# Patient Record
Sex: FEMALE | Race: WHITE | NOT HISPANIC OR LATINO | Employment: OTHER | URBAN - METROPOLITAN AREA
[De-identification: names, ages, dates, MRNs, and addresses within clinical notes are randomized per-mention and may not be internally consistent; named-entity substitution may affect disease eponyms.]

---

## 2022-02-12 ENCOUNTER — HOSPITAL ENCOUNTER (INPATIENT)
Facility: CLINIC | Age: 82
LOS: 2 days | Discharge: HOME | DRG: 557 | End: 2022-02-15
Attending: EMERGENCY MEDICINE | Admitting: HOSPITALIST
Payer: MEDICARE

## 2022-02-12 ENCOUNTER — APPOINTMENT (OUTPATIENT)
Dept: CT IMAGING | Facility: CLINIC | Age: 82
DRG: 557 | End: 2022-02-12
Attending: EMERGENCY MEDICINE
Payer: MEDICARE

## 2022-02-12 DIAGNOSIS — K44.9 HIATAL HERNIA: ICD-10-CM

## 2022-02-12 DIAGNOSIS — N39.0 ACUTE UTI: Primary | ICD-10-CM
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
## 2022-02-12 DIAGNOSIS — E83.42 HYPOMAGNESEMIA: ICD-10-CM

## 2022-02-12 DIAGNOSIS — R41.0 CONFUSION: ICD-10-CM

## 2022-02-12 DIAGNOSIS — R21 RASH: ICD-10-CM

## 2022-02-12 DIAGNOSIS — R53.81 PHYSICAL DECONDITIONING: ICD-10-CM

## 2022-02-12 DIAGNOSIS — I71.21 THORACIC ASCENDING AORTIC ANEURYSM (H): ICD-10-CM

## 2022-02-12 DIAGNOSIS — T79.6XXA TRAUMATIC RHABDOMYOLYSIS, INITIAL ENCOUNTER (H): ICD-10-CM

## 2022-02-12 DIAGNOSIS — N83.201 RIGHT OVARIAN CYST: ICD-10-CM

## 2022-02-12 DIAGNOSIS — N28.89 RENAL MASS: ICD-10-CM

## 2022-02-12 DIAGNOSIS — E78.5 HYPERLIPIDEMIA, UNSPECIFIED HYPERLIPIDEMIA TYPE: ICD-10-CM
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
## 2022-02-12 PROBLEM — N17.9 AKI (ACUTE KIDNEY INJURY) (H): Status: ACTIVE | Noted: 2022-02-12

## 2022-02-12 LAB
ALBUMIN UR-MCNC: 50 MG/DL
ANION GAP SERPL CALCULATED.3IONS-SCNC: 10 MMOL/L (ref 5–18)
APPEARANCE UR: ABNORMAL
APTT PPP: 78 SECONDS (ref 22–38)
BACTERIA #/AREA URNS HPF: ABNORMAL /HPF
BASOPHILS # BLD AUTO: 0 10E3/UL (ref 0–0.2)
BASOPHILS NFR BLD AUTO: 0 %
BILIRUB UR QL STRIP: NEGATIVE
BUN SERPL-MCNC: 28 MG/DL (ref 8–28)
CALCIUM SERPL-MCNC: 9.3 MG/DL (ref 8.5–10.5)
CELLULAR CAST: 2 /LPF
CHLORIDE BLD-SCNC: 111 MMOL/L (ref 98–107)
CK SERPL-CCNC: 3789 U/L (ref 30–190)
CO2 SERPL-SCNC: 19 MMOL/L (ref 22–31)
COLOR UR AUTO: YELLOW
CREAT BLD-MCNC: 1.2 MG/DL (ref 0.6–1.1)
CREAT SERPL-MCNC: 1.22 MG/DL (ref 0.6–1.1)
EOSINOPHIL # BLD AUTO: 0 10E3/UL (ref 0–0.7)
EOSINOPHIL NFR BLD AUTO: 0 %
ERYTHROCYTE [DISTWIDTH] IN BLOOD BY AUTOMATED COUNT: 14.1 % (ref 10–15)
ETHANOL SERPL-MCNC: <10 MG/DL
GFR SERPL CREATININE-BSD FRML MDRD: 44 ML/MIN/1.73M2
GFR SERPL CREATININE-BSD FRML MDRD: 44 ML/MIN/1.73M2
GLUCOSE BLD-MCNC: 125 MG/DL (ref 70–125)
GLUCOSE UR STRIP-MCNC: NEGATIVE MG/DL
GRANULAR CAST: 14 /LPF
HCT VFR BLD AUTO: 43.2 % (ref 35–47)
HGB BLD-MCNC: 14.4 G/DL (ref 11.7–15.7)
HGB UR QL STRIP: ABNORMAL
HOLD SPECIMEN: NORMAL
HYALINE CASTS: 76 /LPF
IMM GRANULOCYTES # BLD: 0 10E3/UL
IMM GRANULOCYTES NFR BLD: 1 %
INR PPP: 4.1 (ref 0.85–1.15)
KETONES UR STRIP-MCNC: 10 MG/DL
LACTATE SERPL-SCNC: 1.5 MMOL/L (ref 0.7–2)
LACTATE SERPL-SCNC: 2.2 MMOL/L (ref 0.7–2)
LEUKOCYTE ESTERASE UR QL STRIP: ABNORMAL
LYMPHOCYTES # BLD AUTO: 0.4 10E3/UL (ref 0.8–5.3)
LYMPHOCYTES NFR BLD AUTO: 5 %
MAGNESIUM SERPL-MCNC: 1.5 MG/DL (ref 1.8–2.6)
MAGNESIUM SERPL-MCNC: 2.1 MG/DL (ref 1.8–2.6)
MCH RBC QN AUTO: 34.8 PG (ref 26.5–33)
MCHC RBC AUTO-ENTMCNC: 33.3 G/DL (ref 31.5–36.5)
MCV RBC AUTO: 104 FL (ref 78–100)
MONOCYTES # BLD AUTO: 1.3 10E3/UL (ref 0–1.3)
MONOCYTES NFR BLD AUTO: 15 %
MUCOUS THREADS #/AREA URNS LPF: PRESENT /LPF
NEUTROPHILS # BLD AUTO: 6.8 10E3/UL (ref 1.6–8.3)
NEUTROPHILS NFR BLD AUTO: 79 %
NITRATE UR QL: NEGATIVE
NRBC # BLD AUTO: 0 10E3/UL
NRBC BLD AUTO-RTO: 0 /100
PH UR STRIP: 5 [PH] (ref 5–7)
PHOSPHATE SERPL-MCNC: 2.8 MG/DL (ref 2.5–4.5)
PLATELET # BLD AUTO: 221 10E3/UL (ref 150–450)
POTASSIUM BLD-SCNC: 4.2 MMOL/L (ref 3.5–5)
PROCALCITONIN SERPL-MCNC: 0.1 NG/ML (ref 0–0.49)
RBC # BLD AUTO: 4.14 10E6/UL (ref 3.8–5.2)
RBC URINE: 6 /HPF
SARS-COV-2 RNA RESP QL NAA+PROBE: NEGATIVE
SODIUM SERPL-SCNC: 140 MMOL/L (ref 136–145)
SP GR UR STRIP: 1.02 (ref 1–1.03)
SQUAMOUS EPITHELIAL: 2 /HPF
TRANSITIONAL EPI: <1 /HPF
TROPONIN I SERPL-MCNC: 0.08 NG/ML (ref 0–0.29)
TSH SERPL DL<=0.005 MIU/L-ACNC: 1.23 UIU/ML (ref 0.3–5)
UROBILINOGEN UR STRIP-MCNC: <2 MG/DL
WBC # BLD AUTO: 8.6 10E3/UL (ref 4–11)
WBC URINE: 32 /HPF

## 2022-02-12 PROCEDURE — 83735 ASSAY OF MAGNESIUM: CPT | Performed by: EMERGENCY MEDICINE

## 2022-02-12 PROCEDURE — 96361 HYDRATE IV INFUSION ADD-ON: CPT

## 2022-02-12 PROCEDURE — 36415 COLL VENOUS BLD VENIPUNCTURE: CPT | Performed by: EMERGENCY MEDICINE

## 2022-02-12 PROCEDURE — 83605 ASSAY OF LACTIC ACID: CPT | Performed by: HOSPITALIST

## 2022-02-12 PROCEDURE — 36415 COLL VENOUS BLD VENIPUNCTURE: CPT | Performed by: HOSPITALIST

## 2022-02-12 PROCEDURE — 258N000003 HC RX IP 258 OP 636: Performed by: EMERGENCY MEDICINE

## 2022-02-12 PROCEDURE — 82550 ASSAY OF CK (CPK): CPT | Performed by: EMERGENCY MEDICINE

## 2022-02-12 PROCEDURE — 72125 CT NECK SPINE W/O DYE: CPT

## 2022-02-12 PROCEDURE — G0378 HOSPITAL OBSERVATION PER HR: HCPCS

## 2022-02-12 PROCEDURE — 85025 COMPLETE CBC W/AUTO DIFF WBC: CPT | Performed by: EMERGENCY MEDICINE

## 2022-02-12 PROCEDURE — 258N000003 HC RX IP 258 OP 636: Performed by: HOSPITALIST

## 2022-02-12 PROCEDURE — 87635 SARS-COV-2 COVID-19 AMP PRB: CPT | Performed by: EMERGENCY MEDICINE

## 2022-02-12 PROCEDURE — 80048 BASIC METABOLIC PNL TOTAL CA: CPT | Performed by: EMERGENCY MEDICINE

## 2022-02-12 PROCEDURE — 82077 ASSAY SPEC XCP UR&BREATH IA: CPT | Performed by: EMERGENCY MEDICINE

## 2022-02-12 PROCEDURE — 81001 URINALYSIS AUTO W/SCOPE: CPT | Performed by: EMERGENCY MEDICINE

## 2022-02-12 PROCEDURE — 99285 EMERGENCY DEPT VISIT HI MDM: CPT | Mod: 25

## 2022-02-12 PROCEDURE — 250N000011 HC RX IP 250 OP 636: Performed by: EMERGENCY MEDICINE

## 2022-02-12 PROCEDURE — 93005 ELECTROCARDIOGRAM TRACING: CPT | Performed by: EMERGENCY MEDICINE

## 2022-02-12 PROCEDURE — 85610 PROTHROMBIN TIME: CPT | Performed by: EMERGENCY MEDICINE

## 2022-02-12 PROCEDURE — 250N000013 HC RX MED GY IP 250 OP 250 PS 637: Performed by: HOSPITALIST

## 2022-02-12 PROCEDURE — 87086 URINE CULTURE/COLONY COUNT: CPT | Performed by: EMERGENCY MEDICINE

## 2022-02-12 PROCEDURE — 71250 CT THORAX DX C-: CPT

## 2022-02-12 PROCEDURE — 87040 BLOOD CULTURE FOR BACTERIA: CPT | Performed by: EMERGENCY MEDICINE

## 2022-02-12 PROCEDURE — C9803 HOPD COVID-19 SPEC COLLECT: HCPCS

## 2022-02-12 PROCEDURE — 96365 THER/PROPH/DIAG IV INF INIT: CPT

## 2022-02-12 PROCEDURE — 84145 PROCALCITONIN (PCT): CPT | Performed by: EMERGENCY MEDICINE

## 2022-02-12 PROCEDURE — 82565 ASSAY OF CREATININE: CPT

## 2022-02-12 PROCEDURE — 83735 ASSAY OF MAGNESIUM: CPT | Performed by: HOSPITALIST

## 2022-02-12 PROCEDURE — 84100 ASSAY OF PHOSPHORUS: CPT | Performed by: HOSPITALIST

## 2022-02-12 PROCEDURE — 70450 CT HEAD/BRAIN W/O DYE: CPT

## 2022-02-12 PROCEDURE — 84484 ASSAY OF TROPONIN QUANT: CPT | Performed by: EMERGENCY MEDICINE

## 2022-02-12 PROCEDURE — 99223 1ST HOSP IP/OBS HIGH 75: CPT | Mod: AI | Performed by: HOSPITALIST

## 2022-02-12 PROCEDURE — 83605 ASSAY OF LACTIC ACID: CPT | Performed by: EMERGENCY MEDICINE

## 2022-02-12 PROCEDURE — 85730 THROMBOPLASTIN TIME PARTIAL: CPT | Performed by: EMERGENCY MEDICINE

## 2022-02-12 PROCEDURE — 84443 ASSAY THYROID STIM HORMONE: CPT | Performed by: HOSPITALIST

## 2022-02-12 RX ORDER — LISINOPRIL 5 MG/1
5 TABLET ORAL EVERY MORNING
Status: DISCONTINUED | OUTPATIENT
Start: 2022-02-13 | End: 2022-02-15 | Stop reason: HOSPADM

## 2022-02-12 RX ORDER — WARFARIN SODIUM 2.5 MG/1
2.5 TABLET ORAL DAILY
COMMUNITY
Start: 2021-12-21

## 2022-02-12 RX ORDER — MINERAL OIL, PETROLATUM 425; 573 MG/G; MG/G
1 OINTMENT OPHTHALMIC AT BEDTIME
Status: DISCONTINUED | OUTPATIENT
Start: 2022-02-12 | End: 2022-02-12

## 2022-02-12 RX ORDER — ONDANSETRON 2 MG/ML
4 INJECTION INTRAMUSCULAR; INTRAVENOUS EVERY 6 HOURS PRN
Status: DISCONTINUED | OUTPATIENT
Start: 2022-02-12 | End: 2022-02-15 | Stop reason: HOSPADM

## 2022-02-12 RX ORDER — ACETAMINOPHEN 650 MG/1
650 SUPPOSITORY RECTAL EVERY 6 HOURS PRN
Status: DISCONTINUED | OUTPATIENT
Start: 2022-02-12 | End: 2022-02-15 | Stop reason: HOSPADM

## 2022-02-12 RX ORDER — ANASTROZOLE 1 MG/1
1 TABLET ORAL DAILY
COMMUNITY
Start: 2022-01-04 | End: 2024-01-11

## 2022-02-12 RX ORDER — SODIUM CHLORIDE 9 MG/ML
INJECTION, SOLUTION INTRAVENOUS CONTINUOUS
Status: ACTIVE | OUTPATIENT
Start: 2022-02-12 | End: 2022-02-14

## 2022-02-12 RX ORDER — ATENOLOL 25 MG/1
25 TABLET ORAL DAILY
Status: DISCONTINUED | OUTPATIENT
Start: 2022-02-12 | End: 2022-02-15 | Stop reason: HOSPADM

## 2022-02-12 RX ORDER — ACETAMINOPHEN 325 MG/1
650 TABLET ORAL EVERY 6 HOURS PRN
Status: DISCONTINUED | OUTPATIENT
Start: 2022-02-12 | End: 2022-02-15 | Stop reason: HOSPADM

## 2022-02-12 RX ORDER — ACETAMINOPHEN 500 MG
500-1000 TABLET ORAL EVERY 6 HOURS PRN
COMMUNITY

## 2022-02-12 RX ORDER — MAGNESIUM SULFATE HEPTAHYDRATE 40 MG/ML
2 INJECTION, SOLUTION INTRAVENOUS ONCE
Status: COMPLETED | OUTPATIENT
Start: 2022-02-12 | End: 2022-02-12

## 2022-02-12 RX ORDER — VITAMIN B COMPLEX
25 TABLET ORAL DAILY
Status: DISCONTINUED | OUTPATIENT
Start: 2022-02-12 | End: 2022-02-15 | Stop reason: HOSPADM

## 2022-02-12 RX ORDER — SODIUM CHLORIDE 9 MG/ML
INJECTION, SOLUTION INTRAVENOUS ONCE
Status: COMPLETED | OUTPATIENT
Start: 2022-02-12 | End: 2022-02-12

## 2022-02-12 RX ORDER — PRAVASTATIN SODIUM 20 MG
40 TABLET ORAL AT BEDTIME
Status: DISCONTINUED | OUTPATIENT
Start: 2022-02-12 | End: 2022-02-15 | Stop reason: HOSPADM

## 2022-02-12 RX ORDER — LIDOCAINE 40 MG/G
CREAM TOPICAL
Status: DISCONTINUED | OUTPATIENT
Start: 2022-02-12 | End: 2022-02-15 | Stop reason: HOSPADM

## 2022-02-12 RX ORDER — ANASTROZOLE 1 MG/1
1 TABLET ORAL DAILY
Status: DISCONTINUED | OUTPATIENT
Start: 2022-02-12 | End: 2022-02-15 | Stop reason: HOSPADM

## 2022-02-12 RX ORDER — MINERAL OIL, PETROLATUM 425; 573 MG/G; MG/G
1 OINTMENT OPHTHALMIC AT BEDTIME
COMMUNITY
End: 2024-01-11

## 2022-02-12 RX ORDER — ONDANSETRON 4 MG/1
4 TABLET, ORALLY DISINTEGRATING ORAL EVERY 6 HOURS PRN
Status: DISCONTINUED | OUTPATIENT
Start: 2022-02-12 | End: 2022-02-15 | Stop reason: HOSPADM

## 2022-02-12 RX ORDER — VITAMIN B COMPLEX
25 TABLET ORAL DAILY
Status: DISCONTINUED | OUTPATIENT
Start: 2022-02-12 | End: 2022-02-12

## 2022-02-12 RX ORDER — SULFAMETHOXAZOLE/TRIMETHOPRIM 800-160 MG
1 TABLET ORAL 2 TIMES DAILY
Status: DISCONTINUED | OUTPATIENT
Start: 2022-02-12 | End: 2022-02-15 | Stop reason: HOSPADM

## 2022-02-12 RX ADMIN — PRAVASTATIN SODIUM 40 MG: 20 TABLET ORAL at 21:09

## 2022-02-12 RX ADMIN — SODIUM CHLORIDE 250 ML: 9 INJECTION, SOLUTION INTRAVENOUS at 18:47

## 2022-02-12 RX ADMIN — SODIUM CHLORIDE: 9 INJECTION, SOLUTION INTRAVENOUS at 16:26

## 2022-02-12 RX ADMIN — ATENOLOL 25 MG: 25 TABLET ORAL at 19:31

## 2022-02-12 RX ADMIN — MAGNESIUM SULFATE HEPTAHYDRATE 2 G: 40 INJECTION, SOLUTION INTRAVENOUS at 14:52

## 2022-02-12 RX ADMIN — SULFAMETHOXAZOLE AND TRIMETHOPRIM 1 TABLET: 800; 160 TABLET ORAL at 19:31

## 2022-02-12 RX ADMIN — SODIUM CHLORIDE 250 ML: 9 INJECTION, SOLUTION INTRAVENOUS at 14:43

## 2022-02-12 RX ADMIN — Medication 25 MCG: at 21:09

## 2022-02-12 RX ADMIN — SODIUM CHLORIDE: 9 INJECTION, SOLUTION INTRAVENOUS at 18:46

## 2022-02-12 RX ADMIN — ANASTROZOLE 1 MG: 1 TABLET ORAL at 19:31

## 2022-02-12 ASSESSMENT — MIFFLIN-ST. JEOR
SCORE: 1228.79
SCORE: 1249.21

## 2022-02-12 ASSESSMENT — ACTIVITIES OF DAILY LIVING (ADL): DEPENDENT_IADLS:: INDEPENDENT

## 2022-02-12 ASSESSMENT — ENCOUNTER SYMPTOMS
BACK PAIN: 0
ABDOMINAL PAIN: 0
SHORTNESS OF BREATH: 0
NECK PAIN: 0

## 2022-02-12 NOTE — H&P
[facility] MEDICINE ADMISSION HISTORY AND PHYSICAL     Assessment & Plan       Cherelle SYEDA Valentine is a 82 year old old female with history of afib on anticoagulation, HTN, HLD presented with unwitnessed fall, acute metabolic encephalopathy, uncomplicated urinary tract infection, KATLYN and rhabdomyolysis. Not tachycardic or hypotensive at all since presentation and currently. Lactic acid noted at borderline 2.2 in setting of dehydration, no oral intake while on the ground and with hypovolemic KATLYN and rhabdomyolysis; aggressive IVF hydration ordered. Also noted penicillin and cephalosporin severe alergies; discussed with pharmacy who agreed with Bactrim. Did discuss all of these - code status, UTI, antibiotics allergies and treating and monitoring closely with Bactrim, rhabdomyolysis, plan for PT/OT evals, etc - with her son and mPOA at bedside, Marlon. Confirmed full code status for now via mPOA/family proxy and he stated he is not aware of any other wishes previously stated or documented/forms.     Acute Cystitis - Urinary Tract Infection  Secondary acute metabolic encephalopathy - 2/2 UTI, KATLYN/dehydration  -As above; d/w pharmacy. Order Bactrim and follow-up urine culture speciation/sensitivities to tailor antibiotics.   -No hypotension, no tachycardia. No fever. Uncomplicated UTI.   -Delirium precautions.   -If becomes acutely agitated or develops acute delirium, utilize verbal redirection first, and involve patient's family/contacts if available. Pharmacologic as-needed interventions as second-line, and would only judiciously consider 1:1 sitter and/or restraints if patient becomes a physical danger to self and/or to others/staff or if previous interventions unsuccessful or not effective.     KATLYN  Rhabdomyolysis  Lactic Acidosis  -By chart review, history, physical exam, and labs, most likely etiology is azotemia, hypovolemia from down on ground for 12 or more hours (but less than 24- hrs) and no  "intake while down.   -Hold potentially nephrogenic or nephrotoxic medications.   -Administer IVF bolus now and initiate maintenance IVF.  -Recheck BMP and Cr tomorrow AM.   -If not improved or normalized, consider further evaluation with FeNa and renal ultrasound.   -Check CK tomorrow AM once only to determine downtrend    Fall  -Fall precautions  -PT, OT evals  -CM/SW cs  -D/w son/Truong Mason already who is in full support of higher needs/level of cares if determined to be the case    HTN  -Order home medications and as needed apply specified hold parameters.     HLD  -Order home statin.    Wounds/Abrasions from fall  -Superficial  -RN care; page MD if further prescribed ointments/gauze/orders needed    Incidental renal cyst and ovarian cyst  -Thoroughly discussed with her son/Truong Mason who stated her PCP is already aware of and following these.   -Outpatient PCP follow-up.   -Recommend repeat  Imaging later with ultrasound to better characterize; offered inpatient eval and again mPOA/son stated her PCP already is aware and can follow-up longitudinally         # Hypomagnesemia: Mg = 1.5 mg/dL (Ref range: 1.8 - 2.6 mg/dL) on admission, will replace as needed    # Obesity: Estimated body mass index is 32.42 kg/m  as calculated from the following:    Height as of this encounter: 1.6 m (5' 3\").    Weight as of this encounter: 83 kg (183 lb).        DVTP: Direct Oral Anticagulants   Code Status: No Order  Disposition: Inpatient   Expected LOS: 2 days   Goals for the hospitalization: KATLYN, rhabdomyolysis, UTI, PT/OT evals, CM/SW    Chief Complaint  fall, confusion     HISTORY     Cherelle SYEDA Valentine is a 82 year old old female with history of afib on anticoagulation, HTN, HLD presented with unwitnessed fall, acute metabolic encephalopathy, uncomplicated urinary tract infection, KATLYN and rhabdomyolysis. Not tachycardic or hypotensive at all since presentation and currently.     She was found by her son-in-law to be on the ground; her " biological son Marlon had spoken with her yesterday around noon and noted she was at her baseline. He does endorse a context of several months of gradual cognitive decline. However, at this time she is endorsed by her son/mPOA to not be at baseline and is confused. She has some insight into this and endorses that she is not her usual full self as well. When found by her son-in-law, she had abrasions and cuts on her elbows and she knew she had fallen sometime yesterday and apparently had been dragging herself around trying to move around and get up unsuccessfully. No pre or post-event symptoms, no chest pain, no SOB, no palpitations or LH/dizziness; fall endorsed to be mechanical. No cough or fever endorsed. She has had UTIs before. ED course notable for imaging showing no acute bleeding but incidental renal cyst and ovarian cyst, which were discussed with her son/mPOA Marlon who stated her PCP is already aware of and following these; also noted Cr 1.22 and CK ~3800 an low magnesium. Otherwise, there is no endorsement of any fevers, rigors, chest pain or shortness of breath, nausea or vomiting or abdominal pain, changes in bowel movements/pattern, focal weakness, or any other new and associated symptoms at this time.  The patient has been compliant with home medications as prescribed. 14 point review of systems performed with the patient without any other pertinent positives at this time. Again discussed with her mPOA/son Marlon and also with ED MD.     The social history, family history, and past medical history were directly reviewed with the patient and corroborated to be accurate as documented below. All questions the patient had at this time were answered to verbalized and stated satisfaction and understanding. Code status was discussed; given acute metabolic encephalopathy and confusion, discussed with mPOA/family proxy son Marlon, who confirmed full code status for now and he also stated he is not aware of any other  wishes previously stated or documented/forms.     Past Medical History     Past Medical History:  No date: Atrial fibrillation (H)  No date: Hypertension     Surgical History     Past Surgical History:   Procedure Laterality Date     APPENDECTOMY       HYSTERECTOMY       JOINT REPLACEMENT       ZZC TOTAL KNEE ARTHROPLASTY Right 6/16/2015    Procedure: KNEE TOTAL ARTHROPLASTY, RIGHT;  Surgeon: Aftab Martel MD;  Location: [facility];  Service: Orthopedics     Family History    Reviewed,and no family history of inherited/genetic conditions endorsed.     Social History      Social History     Tobacco Use     Smoking status: Never Smoker     Smokeless tobacco: Not on file   Substance Use Topics     Alcohol use: Yes     Alcohol/week: 6.0 standard drinks     Drug use: No      Allergies     Allergies   Allergen Reactions     Cephalosporins Anaphylaxis     Iodinated Contrast Media [Diagnostic X-Ray Materials] Anaphylaxis     Penicillins Hives     Prior to Admission Medications      Prior to Admission Medications   Prescriptions Last Dose Informant Patient Reported? Taking?   atenolol (TENORMIN) 25 MG tablet   Yes No   Sig: [ATENOLOL (TENORMIN) 25 MG TABLET] Take 25 mg by mouth daily.   cholecalciferol, vitamin D3, 1,000 unit tablet   Yes No   Sig: [CHOLECALCIFEROL, VITAMIN D3, 1,000 UNIT TABLET] Take 1,000 Units by mouth daily.   cycloSPORINE (RESTASIS) 0.05 % ophthalmic emulsion   Yes No   Sig: [CYCLOSPORINE (RESTASIS) 0.05 % OPHTHALMIC EMULSION] Administer 1 drop to both eyes 2 (two) times a day.    lisinopril (PRINIVIL,ZESTRIL) 5 MG tablet   Yes No   Sig: [LISINOPRIL (PRINIVIL,ZESTRIL) 5 MG TABLET] Take 5 mg by mouth every morning.    multivitamin with minerals (THERA-M) 9 mg iron-400 mcg Tab tablet   Yes No   Sig: [MULTIVITAMIN WITH MINERALS (THERA-M) 9 MG IRON-400 MCG TAB TABLET] Take 1 tablet by mouth daily.   pravastatin (PRAVACHOL) 40 MG tablet   Yes No   Sig: [PRAVASTATIN (PRAVACHOL) 40 MG TABLET] Take  40 mg by mouth bedtime.   warfarin (COUMADIN) 1 MG tablet   Yes No   Sig: [WARFARIN (COUMADIN) 1 MG TABLET] Take  by mouth daily. Coumadin 3.75mg po on Thurs, 2.5mg po daily      Facility-Administered Medications: None      Review of Systems     A 12 point comprehensive review of systems was negative except as noted above in HPI.    PHYSICAL EXAMINATION       Vitals      Temp:  [98.1  F (36.7  C)] 98.1  F (36.7  C)  Pulse:  [] 94  Resp:  [8-26] 8  BP: (129-138)/(76-97) 129/79  SpO2:  [94 %-97 %] 94 %    Examination     GENERAL:  Alert, appears comfortable, in no acute distress, appears stated age   HEAD:  Normocephalic, without obvious abnormality, atraumatic   EYES:  PERRL, conjunctiva/corneas clear, no scleral icterus, EOM's intact   NOSE: Nares normal, septum midline, mucosa normal, no drainage   THROAT: Lips, mucosa, and tongue normal; teeth and gums normal, mouth moist   NECK: Supple, symmetrical, trachea midline   BACK:   Symmetric, no curvature, ROM normal   LUNGS:   Clear to auscultation bilaterally, no rales, rhonchi, or wheezing, symmetric chest rise on inhalation, respirations unlabored   CHEST WALL:  No tenderness or deformity   HEART:  Regular rate and rhythm, S1 and S2 normal, no murmur, rub, or gallop    ABDOMEN:   Soft, non-tender, bowel sounds active all four quadrants, no masses, no organomegaly, no rebound or guarding   EXTREMITIES: Extremities normal, atraumatic, no cyanosis or edema    SKIN: Dry to touch, no exanthems in the visualized areas   NEURO: Alert, oriented x 4, moves all four extremities freely, non-focal   PSYCH: Cooperative, behavior is appropriate      Pertinent Lab     Most Recent 3 CBC's:Recent Labs   Lab Test 02/12/22  1347   WBC 8.6   HGB 14.4   *        Most Recent 3 BMP's:Recent Labs   Lab Test 02/12/22  1415 02/12/22  1347   NA  --  140   POTASSIUM  --  4.2   CHLORIDE  --  111*   CO2  --  19*   BUN  --  28   CR 1.2* 1.22*   ANIONGAP  --  10   TIANA  --  9.3    GLC  --  125     Most Recent 2 LFT's:No lab results found.      Pertinent Radiology     Radiology Results:   Recent Results (from the past 24 hour(s))   Head CT w/o contrast    Narrative    EXAM: EXAM: CT HEAD W/O CONTRAST, CT CERVICAL SPINE W/O CONTRAST  LOCATION: [facility]  DATE/TIME: 2/12/2022 2:15 PM    INDICATION: Traumatic head and cervical spine injury.  COMPARISON: None.  TECHNIQUE:   1) Routine CT Head without IV contrast. Multiplanar reformats. Dose reduction techniques were used.  2) Routine CT Cervical Spine without IV contrast. Multiplanar reformats. Dose reduction techniques were used.    FINDINGS:   HEAD CT:   INTRACRANIAL CONTENTS: No intracranial hemorrhage, extraaxial collection, or mass effect.  No CT evidence of acute infarct. Mild presumed chronic small vessel ischemic changes. Moderate generalized volume loss. No hydrocephalus.     VISUALIZED ORBITS/SINUSES/MASTOIDS: No intraorbital abnormality. No paranasal sinus mucosal disease. No middle ear or mastoid effusion.    BONES/SOFT TISSUES: No acute abnormality.    CERVICAL SPINE CT:   VERTEBRA: Normal vertebral body heights and alignment. No fracture or posttraumatic subluxation.     CANAL/FORAMINA: Mild bilateral C3-C4 neural foraminal stenoses. No spinal canal stenosis.    PARASPINAL: No extraspinal abnormality. Visualized lung fields are clear.      Impression    IMPRESSION:  HEAD CT:  1.  No CT evidence for acute intracranial process.  2.  Brain atrophy and presumed chronic microvascular ischemic changes as above.    CERVICAL SPINE CT:  1.  No CT evidence for acute fracture or post traumatic subluxation.  2.  Mild bilateral C3-C4 neural foraminal stenosis.   Cervical spine CT w/o contrast    Narrative    EXAM: EXAM: CT HEAD W/O CONTRAST, CT CERVICAL SPINE W/O CONTRAST  LOCATION: [facility]  DATE/TIME: 2/12/2022 2:15 PM    INDICATION: Traumatic head and cervical spine injury.  COMPARISON:  None.  TECHNIQUE:   1) Routine CT Head without IV contrast. Multiplanar reformats. Dose reduction techniques were used.  2) Routine CT Cervical Spine without IV contrast. Multiplanar reformats. Dose reduction techniques were used.    FINDINGS:   HEAD CT:   INTRACRANIAL CONTENTS: No intracranial hemorrhage, extraaxial collection, or mass effect.  No CT evidence of acute infarct. Mild presumed chronic small vessel ischemic changes. Moderate generalized volume loss. No hydrocephalus.     VISUALIZED ORBITS/SINUSES/MASTOIDS: No intraorbital abnormality. No paranasal sinus mucosal disease. No middle ear or mastoid effusion.    BONES/SOFT TISSUES: No acute abnormality.    CERVICAL SPINE CT:   VERTEBRA: Normal vertebral body heights and alignment. No fracture or posttraumatic subluxation.     CANAL/FORAMINA: Mild bilateral C3-C4 neural foraminal stenoses. No spinal canal stenosis.    PARASPINAL: No extraspinal abnormality. Visualized lung fields are clear.      Impression    IMPRESSION:  HEAD CT:  1.  No CT evidence for acute intracranial process.  2.  Brain atrophy and presumed chronic microvascular ischemic changes as above.    CERVICAL SPINE CT:  1.  No CT evidence for acute fracture or post traumatic subluxation.  2.  Mild bilateral C3-C4 neural foraminal stenosis.   CT Chest Abdomen Pelvis w/o Contrast    Narrative    EXAM: CT CHEST ABDOMEN PELVIS W/O CONTRAST  LOCATION: [facility]  DATE/TIME: 2/12/2022 2:17 PM    INDICATION: Status post fall, on blood thinners, weakness  COMPARISON: None.  TECHNIQUE: CT scan of the chest, abdomen, and pelvis was performed without IV contrast. Multiplanar reformats were obtained. Dose reduction techniques were used.   CONTRAST: None.    FINDINGS:   LUNGS AND PLEURA: There is linear atelectasis or scarring in the anterior right upper lobe, right middle lobe, the lingula, and both lower lobes. Mild groundglass opacity is also seen in the left lower lobe likely  infectious or inflammatory. There is   tubular bronchiectasis in the left lower lobe. No pneumothorax or pleural effusion.    MEDIASTINUM/AXILLAE: Atherosclerotic change of the aorta. There is mild aneurysmal dilatation of the ascending thoracic aorta measuring 4.2 x 4.1 cm. No evidence for mediastinal hemorrhage. No adenopathy. There is cardiomegaly with enlargement of the   left atrium. No pericardial effusion. Small hiatal hernia.    CORONARY ARTERY CALCIFICATION: Moderate.    HEPATOBILIARY: Multiple low-attenuation lesions the liver most compatible with cysts. Normal gallbladder.    PANCREAS: Normal.    SPLEEN: Calcified granulomas in the spleen. Low-attenuation lesions are seen in the spleen likely cysts but incompletely characterized.    ADRENAL GLANDS: Normal.    KIDNEYS/BLADDER: Hyperdense lesion upper pole left kidney measuring 1 cm. This likely represents a cyst complicated by proteinaceous material or hemorrhage. However, it is incompletely assessed without contrast. No hydronephrosis. Normal ureters and   bladder.    BOWEL: Normal small bowel. Appendix not discretely visualized. Multiple colonic diverticula without active diverticulitis.    LYMPH NODES: No lymphadenopathy.    VASCULATURE: Atherosclerotic change of the aorta without aneurysm.    PELVIC ORGANS: Previous hysterectomy. 3.9 x 2.9 cm mildly complicated right ovarian cyst containing some wall thickening posteriorly. Follow-up with ultrasound is recommended for further assessment if indicated clinically. Normal left ovary. No free   fluid in the pelvis.    MUSCULOSKELETAL: Degenerative change in the spine, sternoclavicular joints right greater than left, the SI joints, and the hips.      Impression    IMPRESSION:  1.  No evidence for acute intra-abdominal or intrathoracic trauma. Sensitivity is decreased without IV contrast.  2.  Minimal groundglass opacity in the left lower lobe may be infectious or inflammatory.  3.  Aneurysmal dilatation of  the ascending thoracic aorta measuring 4.2 cm.  4.  Small hiatal hernia.  5.  Colonic diverticulosis.  6.  3.9 cm complex right ovarian cyst. Nonemergent ultrasound could be considered for further assessment if indicated clinically.  7.  Hyperdense lesion upper pole left kidney likely a complex cyst. This could be further evaluated with nonemergent ultrasound if indicated clinically.           EKG Results: personally reviewed.     Advance Care Planning        Kiet Walker MD  [facility]   Phone: #[phone]

## 2022-02-12 NOTE — PROGRESS NOTES
Pharmacy Note - Admission Medication History    Pertinent Provider Information:     Patient recently started on Arimidex last month.      ______________________________________________________________________    Prior To Admission (PTA) med list completed and updated in EMR.       PTA Med List   Medication Sig Note Last Dose     acetaminophen (TYLENOL) 500 MG tablet Take 500-1,000 mg by mouth every 6 hours as needed for pain 2/12/2022: Doesn't take often, not in much pain. Past Month at Unknown time     anastrozole (ARIMIDEX) 1 MG tablet Take 1 mg by mouth daily 2/12/2022: New, started January 2022. 2/11/2022 at missed 2/12 dose     atenolol (TENORMIN) 25 MG tablet [ATENOLOL (TENORMIN) 25 MG TABLET] Take 25 mg by mouth daily.  2/11/2022 at missed 2/12 dose     cholecalciferol, vitamin D3, 1,000 unit tablet [CHOLECALCIFEROL, VITAMIN D3, 1,000 UNIT TABLET] Take 1,000 Units by mouth daily.  2/11/2022 at Unknown time     lisinopril (PRINIVIL,ZESTRIL) 5 MG tablet [LISINOPRIL (PRINIVIL,ZESTRIL) 5 MG TABLET] Take 5 mg by mouth every morning.   2/11/2022 at missed 2/12 dose     multivitamin with minerals (THERA-M) 9 mg iron-400 mcg Tab tablet Take 1 tablet by mouth At Bedtime Centrum Silver  2/11/2022 at pm     pravastatin (PRAVACHOL) 40 MG tablet [PRAVASTATIN (PRAVACHOL) 40 MG TABLET] Take 40 mg by mouth bedtime.  2/11/2022 at pm     warfarin ANTICOAGULANT (COUMADIN) 2.5 MG tablet Take 2.5 mg by mouth daily 2/12/2022: Stable on 2.5 mg daily dose. Takes in the morning. 2/11/2022 at missed 2/12 dose     White Petrolatum-Mineral Oil (REFRESH P.M.) OINT Place 1 each into both eyes At Bedtime  2/11/2022 at pm       Information source(s): Patient, Family member, Clinic records and [facility]  Method of interview communication: in-person    Summary of Changes to PTA Med List  New: Arimidex, Refresh PM, Tylenol  Discontinued: Restasis opth  Changed: warfarin strength is 2.5 mg (previously entered as 1  mg)    Patient was asked about OTC/herbal products specifically.  PTA med list reflects this.    In the past week, patient estimated taking medication this percent of the time:  greater than 90%. Missed today's doses 2/12/22.    Allergies were reviewed, assessed, and updated with the patient.      Patient did not bring any medications to the hospital and can't retrieve from home. No multi-dose medications are available for use during hospital stay.     The information provided in this note is only as accurate as the sources available at the time of the update(s).    Thank you for the opportunity to participate in the care of this patient.    Ivory Esposito [facility]  2/12/2022 4:36 PM

## 2022-02-12 NOTE — ED TRIAGE NOTES
Lives by self. History of dementia. Son went to check on her and found prone on bathroom floor. Patient poor historian and is insistent that she sat down on floor to clean/do something to floor and could not get up. Does take blood thinners. Red rash noted to abd and thighs possibly from lying prone for unknown time. Knows name, place and month but unsure of birthday. Dried blood noted on lips and patient states it was there yesterday when she went to brush teeth. Only complaint of pain is right hand. Minor skin tears to elbows. Son on way here per EMS

## 2022-02-12 NOTE — ED NOTES
Patient now states that she was cleaning floor last night and got tired and just laid on floor to sleep.

## 2022-02-12 NOTE — ED NOTES
Bed: [facility]05  Expected date: 2/12/22  Expected time:   Means of arrival: Ambulance  Comments:  [place] female weakness

## 2022-02-12 NOTE — CONSULTS
Care Management Initial Consult    General Information  Assessment completed with: Patient,Children,  (patient and son Marlon)  Type of CM/SW Visit: Initial Assessment    Primary Care Provider verified and updated as needed: Yes   Readmission within the last 30 days:        Reason for Consult: discharge planning  Advance Care Planning: Advance Care Planning Reviewed: concerns discussed   (family and patient plan to discuss. ACD given to patient and)       Communication Assessment  Patient's communication style: spoken language (English or Bilingual)    Hearing Difficulty or Deaf: no   Wear Glasses or Blind: no    Cognitive  Cognitive/Neuro/Behavioral: .WDL except (History of dementia and per EMS might be more confused than normal)  Level of Consciousness: confused  Arousal Level: opens eyes spontaneously  Orientation: person,place,situation  Mood/Behavior: calm,cooperative     Speech: clear,spontaneous    Living Environment:   People in home: alone     Current living Arrangements: house      Able to return to prior arrangements: yes       Family/Social Support:  Care provided by: self  Provides care for: no one     Children          Description of Support System:           Current Resources:   Patient receiving home care services: No     Community Resources: None  Equipment currently used at home: none  Supplies currently used at home: None    Employment/Financial:  Employment Status:          Financial Concerns:             Lifestyle & Psychosocial Needs:  Social Determinants of Health     Tobacco Use: Unknown     Smoking Tobacco Use: Never Smoker     Smokeless Tobacco Use: Unknown   Alcohol Use: Not on file   Financial Resource Strain: Not on file   Food Insecurity: Not on file   Transportation Needs: Not on file   Physical Activity: Not on file   Stress: Not on file   Social Connections: Not on file   Intimate Partner Violence: Not on file   Depression: Not on file   Housing Stability: Not on file       Functional  Status:  Prior to admission patient needed assistance:   Dependent ADLs:: Independent  Dependent IADLs:: Independent  Assesssment of Functional Status: Not at baseline with ADL Functioning    Mental Health Status:          Chemical Dependency Status:                Values/Beliefs:  Spiritual, Cultural Beliefs, Scientology Practices, Values that affect care:                 Additional Information:  AIDET completed. Writer met with Pt and Pts son Marlon: [phone]. Pt lives by herself in a private residence. She fell but doesn't remember falling. Per chart and family she has had cognitive decline and they were discussing different care options.Patient does not use a walker, she drives short distances and prepares her own meals, meds on own. Patient does not have health care directive. Offered bringing them an ACD for reference and to fill out. Son said they were going to be discussing. Discussed discharge with home care and other options for long term.     MOON and Observation status given and explained, pt and family verbalized understanding.    Anticipate pt will DC back home with home care vs no needs.  Family will transport upon DC. Informed that CM will follow progression of care.   Tata Chaudhary RN, CM, DONNIE    Son given Senior Housing Guide and honoring choices health care directive.     Tata Chaudhary RN

## 2022-02-12 NOTE — ED PROVIDER NOTES
EMERGENCY DEPARTMENT ENCOUNTER      NAME: Cherelle Valentine  AGE: 82 year old female  YOB: 1940  MRN: [id number]  EVALUATION DATE & TIME: 2022  1:17 PM    PCP: No primary care provider on file.    ED PROVIDER: Estelita Newby M.D.        Chief Complaint   Patient presents with     Generalized Weakness         FINAL IMPRESSION:    1. Fall in home, initial encounter    2. Rash    3. Confusion    4. Traumatic rhabdomyolysis, initial encounter (H)    5. Hypomagnesemia    6. Right ovarian cyst    7. Renal mass    8. Hiatal hernia    9. Thoracic ascending aortic aneurysm (H)            MEDICAL DECISION MAKIN year old female with some decreased cognitive abilities lately who presents emergency department after unwitnessed fall.  Was found in her apartment on the floor.  Signs of blood on her carpet suggesting that she was pulling herself around the apartment and has abrasions on her elbows consistent with this.  She does have some redness on dependent areas of her body suggesting that she laid on the floor for a prolonged period of time.  Mild rhabdomyolysis.  Laboratories most significant for dehydration and UTI.  She reports anaphylaxis to cephalosporins and allergy to penicillins.  Hospitalist discussed with pharmacy as far as antibiotic recommendations.  She does not appear toxic or septic.  Plan at this time is admission to the hospital for IV fluid hydration and ongoing management.  She will go to [facility].        ED COURSE:  1:20 PM I met with the patient to gather history and perform my exam. ED course and treatment discussed.    2:16 PM  Patient's son is now present at bedside.  He last talked to her yesterday around noon and she seemed to be her baseline self though he does admit that cognitively she has been declining over some time now.  She does live alone.  Patient son-in-law was the one who found her today on the floor.  Reported that she was found on the floor in the bedroom but  there is clearly blood streaks on the carpet to suggest that she was dragging herself across the floor throughout her home.  There is not significant amount of blood but enough to be consistent with the abrasions on her elbows consistent with pulling herself on the floor.  They have been talking is a family about potential nursing home placement in the near future but son realizes at this point this may need to be expedited.  There is a slight chance that he would be able to care for her temporarily until he could get her into nursing home.    3:55 PM  Patient has been accepted to the hospital by the hospitalist, Dr. Walker.  She has a UTI but allergies to cephalosporins and penicillins.  Discussed with hospitalist and the plan is that they will talk with pharmacist to help determine antibiotic choice.  Patient does not appear to be septic at this time.  Lactic acid a tiny bit elevated but more likely due to dehydration and her rhabdo.  She has not been hypotensive or tachycardic.  Plan at this time is admission to the hospital for ongoing hydration and management.  The rash that she has in her body is all consistent with pressure points and I do not think it represents a life-threatening condition but more a sign of how long she actually laid on the floor.  Patient and son aware of the results and agree with the plan for admission.  Several incidental findings were found but noncontributory at this time.  Thoracic aortic aneurysm also found but do not feel that this is the cause of her symptoms and she is not actively bleeding.  Son is interested in taking the patient home when she is stable from the hospital.  He is aware that she will likely need nursing home placement here for the future and that he feels comfortable taking her home until that placement can be found when it is appropriate for discharge from the hospital.  No evidence at this time for an acute cardiac etiology.  More likely she has this UTI which  contribute to increased confusion and likely her fall.  No bony traumatic injuries appreciated at this time on imaging or exam.      COVID-19 PPE worn during patient evaluation:  Mask: n95 and homemade masks  Eye Protection: goggles  Gown: none  Hair cover: yes  Face shield: yes   Patient wearing a mask: yes    At the conclusion of the encounter I discussed the results of all of the tests and the disposition. Their questions were answered. The patient (and any family present) acknowledged understanding and were agreeable with the care plan.      CONSULTANTS:  None        MEDICATIONS GIVEN IN THE EMERGENCY:  Medications   sodium chloride 0.9% infusion (has no administration in time range)   0.9% sodium chloride BOLUS (0 mLs Intravenous Stopped 2/12/22 9575)   magnesium sulfate 2 g in water intermittent infusion (2 g Intravenous New Bag 2/12/22 9416)           NEW PRESCRIPTIONS STARTED AT TODAY'S ER VISIT     Medication List      There are no discharge medications for this visit.           CONDITION:  stable        DISPOSITION:  *Med surg as accepted by Dr. Walker, hospitalist         =================================================================  =================================================================    HPI    Patient information was obtained from: EMS, patient and family    Use of Intrepreter: N/A     Cherelle Valentine is a 82 year old female with history of atrial fibrillation, hypertension, and CKD stage 3 who presents to the ER with complaints of generalized weakness.    History limited due to confusion.    Per EMS, patient's son went to check on the patient when he found her prone in the bathroom. It is unknown how long she was down. Son is on his way to the ED to provide further history.    Per patient, she reports she was on the ground because she was cleaning. Denies pain, vomiting, diarrhea, and any other complaints.    Patient's son states that he last spoke to her around noon yesterday.   Cognitively she has been declining now for some time but seem to be her current baseline yesterday.  Patient son-in-law found her on the floor in the bedroom this morning and called patient's son and then EMS was called.  Blood streaks on her carpet suggesting that she dragged herself across the floor by her elbows to multiple rooms of her home.      REVIEW OF SYSTEMS - ROS limited due to confusion  Review of Systems   Unable to perform ROS: Other   Respiratory: Negative for shortness of breath.    Cardiovascular: Negative for chest pain.   Gastrointestinal: Negative for abdominal pain.   Musculoskeletal: Negative for back pain and neck pain.         PAST MEDICAL HISTORY:  Past Medical History:   Diagnosis Date     Atrial fibrillation (H)      Hypertension          PAST SURGICAL HISTORY:  Past Surgical History:   Procedure Laterality Date     APPENDECTOMY       HYSTERECTOMY       JOINT REPLACEMENT       ZZC TOTAL KNEE ARTHROPLASTY Right 6/16/2015    Procedure: KNEE TOTAL ARTHROPLASTY, RIGHT;  Surgeon: Aftab Martel MD;  Location: [facility];  Service: Orthopedics         CURRENT MEDICATIONS:    Prior to Admission medications    Medication Sig Start Date End Date Taking? Authorizing Provider   atenolol (TENORMIN) 25 MG tablet [ATENOLOL (TENORMIN) 25 MG TABLET] Take 25 mg by mouth daily. 6/11/15   Provider, Historical   cholecalciferol, vitamin D3, 1,000 unit tablet [CHOLECALCIFEROL, VITAMIN D3, 1,000 UNIT TABLET] Take 1,000 Units by mouth daily. 6/11/15   Provider, Historical   cycloSPORINE (RESTASIS) 0.05 % ophthalmic emulsion [CYCLOSPORINE (RESTASIS) 0.05 % OPHTHALMIC EMULSION] Administer 1 drop to both eyes 2 (two) times a day.  6/11/15   Provider, Historical   lisinopril (PRINIVIL,ZESTRIL) 5 MG tablet [LISINOPRIL (PRINIVIL,ZESTRIL) 5 MG TABLET] Take 5 mg by mouth every morning.  6/11/15   Provider, Historical   multivitamin with minerals (THERA-M) 9 mg iron-400 mcg Tab tablet [MULTIVITAMIN WITH  "MINERALS (THERA-M) 9 MG IRON-400 MCG TAB TABLET] Take 1 tablet by mouth daily. 6/11/15   Provider, Historical   pravastatin (PRAVACHOL) 40 MG tablet [PRAVASTATIN (PRAVACHOL) 40 MG TABLET] Take 40 mg by mouth bedtime. 6/11/15   Provider, Historical   warfarin (COUMADIN) 1 MG tablet [WARFARIN (COUMADIN) 1 MG TABLET] Take  by mouth daily. Coumadin 3.75mg po on Thurs, 2.5mg po daily 7/6/15   Aftab Martel MD         ALLERGIES:  Allergies   Allergen Reactions     Cephalosporins Anaphylaxis     Iodinated Contrast Media [Diagnostic X-Ray Materials] Anaphylaxis     Penicillins Hives         FAMILY HISTORY:  No family history on file.      SOCIAL HISTORY:  Social History     Socioeconomic History     Marital status:      Spouse name: Not on file     Number of children: Not on file     Years of education: Not on file     Highest education level: Not on file   Occupational History     Not on file   Tobacco Use     Smoking status: Never Smoker     Smokeless tobacco: Not on file   Substance and Sexual Activity     Alcohol use: Yes     Alcohol/week: 6.0 standard drinks     Drug use: No     Sexual activity: Not on file   Other Topics Concern     Not on file   Social History Narrative     Not on file     Social Determinants of Health     Financial Resource Strain: Not on file   Food Insecurity: Not on file   Transportation Needs: Not on file   Physical Activity: Not on file   Stress: Not on file   Social Connections: Not on file   Intimate Partner Violence: Not on file   Housing Stability: Not on file         VITALS:  Patient Vitals for the past 24 hrs:   BP Temp Temp src Pulse Resp SpO2 Height Weight   02/12/22 1430 129/79 -- -- 94 8 94 % -- --   02/12/22 1400 138/76 -- -- 96 25 94 % -- --   02/12/22 1330 (!) 132/97 -- -- 104 26 94 % -- --   02/12/22 1329 (!) 131/94 98.1  F (36.7  C) Oral 109 20 97 % 1.6 m (5' 3\") 83 kg (183 lb)       Wt Readings from Last 3 Encounters:   02/12/22 83 kg (183 lb)   06/16/15 86.6 kg (191 " lb)         PHYSICAL EXAM    Constitutional:  Well developed, Well nourished, NAD, GCS 15  HENT:  Normocephalic, Atraumatic, Bilateral external ears normal, Nose normal. Neck-  Normal range of motion, No tenderness, Supple, No stridor.   Eyes:  PERRL, EOMI, Conjunctiva normal, No discharge.  Respiratory:  Normal breath sounds, No respiratory distress, No wheezing, Speaks full sentences easily. No cough.   Cardiovascular:  Normal heart rate, Regular rhythm,  No murmurs, No rubs, No gallops. Chest wall nontender.   GI:  No excessive obesity.  Bowel sounds normal, Soft, No tenderness, No masses, No flank tenderness. No rebound or guarding.   : deferred  Musculoskeletal: No cyanosis, No clubbing. Good range of motion in all major joints. No tenderness to palpation or major deformities noted. No tenderness of the CTLS spine.   Integument:  Warm, Dry, +areas of erythema and pinpoint petechia on areas of likely pressure points while she was lying on the floor.  She has none of these areas laterally, posteriorly on her body, or in areas in between these.  All consistent with areas that would have been the highest pressure points while laying prone on the floor for presumed prolonged period of time. Abrasions to elbows noted.  Neurologic:  Alert & oriented to person, place and month BUT did not know year or birth date, Normal motor function, Normal sensory function, No focal deficits noted.   Psychiatric:  Affect normal, Cooperative          LAB:  All pertinent labs reviewed and interpreted.  Recent Results (from the past 24 hour(s))   ECG 12-LEAD WITH MUSE (LHE)    Collection Time: 02/12/22  1:25 PM   Result Value Ref Range    Systolic Blood Pressure 131 mmHg    Diastolic Blood Pressure 94 mmHg    Ventricular Rate 93 BPM    Atrial Rate 300 BPM    ME Interval  ms    QRS Duration 64 ms     ms    QTc 445 ms    P Axis  degrees    R AXIS -18 degrees    T Axis -10 degrees    Interpretation ECG       Atrial  fibrillation  Low voltage QRS  Inferior infarct (cited on or before 16-JUN-2015)  Possible Anterolateral infarct , age undetermined  Abnormal ECG  When compared with ECG of 16-JUN-2015 09:55,  No significant change was found     Basic metabolic panel    Collection Time: 02/12/22  1:47 PM   Result Value Ref Range    Sodium 140 136 - 145 mmol/L    Potassium 4.2 3.5 - 5.0 mmol/L    Chloride 111 (H) 98 - 107 mmol/L    Carbon Dioxide (CO2) 19 (L) 22 - 31 mmol/L    Anion Gap 10 5 - 18 mmol/L    Urea Nitrogen 28 8 - 28 mg/dL    Creatinine 1.22 (H) 0.60 - 1.10 mg/dL    Calcium 9.3 8.5 - 10.5 mg/dL    Glucose 125 70 - 125 mg/dL    GFR Estimate 44 (L) >60 mL/min/1.73m2   INR    Collection Time: 02/12/22  1:47 PM   Result Value Ref Range    INR 4.10 (H) 0.85 - 1.15   PTT    Collection Time: 02/12/22  1:47 PM   Result Value Ref Range    aPTT 78 (H) 22 - 38 Seconds   Lactic acid whole blood    Collection Time: 02/12/22  1:47 PM   Result Value Ref Range    Lactic Acid 2.2 (H) 0.7 - 2.0 mmol/L   Troponin I (now)    Collection Time: 02/12/22  1:47 PM   Result Value Ref Range    Troponin I 0.08 0.00 - 0.29 ng/mL   Magnesium    Collection Time: 02/12/22  1:47 PM   Result Value Ref Range    Magnesium 1.5 (L) 1.8 - 2.6 mg/dL   Alcohol level blood    Collection Time: 02/12/22  1:47 PM   Result Value Ref Range    Alcohol, Blood <10 None detected mg/dL   CK total    Collection Time: 02/12/22  1:47 PM   Result Value Ref Range    CK 3,789 (HH) 30 - 190 U/L   Extra Blue Top Tube    Collection Time: 02/12/22  1:47 PM   Result Value Ref Range    Hold Specimen JIC    Extra Red Top Tube    Collection Time: 02/12/22  1:47 PM   Result Value Ref Range    Hold Specimen JIC    Extra Green Top (Lithium Heparin) Tube    Collection Time: 02/12/22  1:47 PM   Result Value Ref Range    Hold Specimen JIC    Extra Purple Top Tube    Collection Time: 02/12/22  1:47 PM   Result Value Ref Range    Hold Specimen JIC    CBC with platelets and differential     Collection Time: 02/12/22  1:47 PM   Result Value Ref Range    WBC Count 8.6 4.0 - 11.0 10e3/uL    RBC Count 4.14 3.80 - 5.20 10e6/uL    Hemoglobin 14.4 11.7 - 15.7 g/dL    Hematocrit 43.2 35.0 - 47.0 %     (H) 78 - 100 fL    MCH 34.8 (H) 26.5 - 33.0 pg    MCHC 33.3 31.5 - 36.5 g/dL    RDW 14.1 10.0 - 15.0 %    Platelet Count 221 150 - 450 10e3/uL    % Neutrophils 79 %    % Lymphocytes 5 %    % Monocytes 15 %    % Eosinophils 0 %    % Basophils 0 %    % Immature Granulocytes 1 %    NRBCs per 100 WBC 0 <1 /100    Absolute Neutrophils 6.8 1.6 - 8.3 10e3/uL    Absolute Lymphocytes 0.4 (L) 0.8 - 5.3 10e3/uL    Absolute Monocytes 1.3 0.0 - 1.3 10e3/uL    Absolute Eosinophils 0.0 0.0 - 0.7 10e3/uL    Absolute Basophils 0.0 0.0 - 0.2 10e3/uL    Absolute Immature Granulocytes 0.0 <=0.4 10e3/uL    Absolute NRBCs 0.0 10e3/uL   Asymptomatic COVID-19 Virus (Coronavirus) by PCR Nasopharyngeal    Collection Time: 02/12/22  1:48 PM    Specimen: Nasopharyngeal; Swab   Result Value Ref Range    SARS CoV2 PCR Negative Negative   Creatinine POCT    Collection Time: 02/12/22  2:15 PM   Result Value Ref Range    Creatinine POCT 1.2 (H) 0.6 - 1.1 mg/dL    GFR, ESTIMATED POCT 44 (L) >60 mL/min/1.73m2   UA with Microscopic reflex to Culture    Collection Time: 02/12/22  2:49 PM    Specimen: Urine, Catheter   Result Value Ref Range    Color Urine Yellow Colorless, Straw, Light Yellow, Yellow    Appearance Urine Turbid (A) Clear    Glucose Urine Negative Negative mg/dL    Bilirubin Urine Negative Negative    Ketones Urine 10  (A) Negative mg/dL    Specific Gravity Urine 1.025 1.001 - 1.030    Blood Urine 0.5 mg/dL (A) Negative    pH Urine 5.0 5.0 - 7.0    Protein Albumin Urine 50  (A) Negative mg/dL    Urobilinogen Urine <2.0 <2.0 mg/dL    Nitrite Urine Negative Negative    Leukocyte Esterase Urine 500 Agapito/uL (A) Negative    Bacteria Urine Few (A) None Seen /HPF    Mucus Urine Present (A) None Seen /LPF    RBC Urine 6 (H) <=2 /HPF     WBC Urine 32 (H) <=5 /HPF    Squamous Epithelials Urine 2 (H) <=1 /HPF    Transitional Epithelials Urine <1 <=1 /HPF    Hyaline Casts Urine 76 (H) <=2 /LPF    Cellular Casts Urine 2 (H) None Seen /LPF    Granular Casts Urine 14 (H) None Seen /LPF       No results found for: [facility]        RADIOLOGY:  Reviewed all pertinent imaging. Please see official radiology report.    CT Chest Abdomen Pelvis w/o Contrast   Final Result   IMPRESSION:   1.  No evidence for acute intra-abdominal or intrathoracic trauma. Sensitivity is decreased without IV contrast.   2.  Minimal groundglass opacity in the left lower lobe may be infectious or inflammatory.   3.  Aneurysmal dilatation of the ascending thoracic aorta measuring 4.2 cm.   4.  Small hiatal hernia.   5.  Colonic diverticulosis.   6.  3.9 cm complex right ovarian cyst. Nonemergent ultrasound could be considered for further assessment if indicated clinically.   7.  Hyperdense lesion upper pole left kidney likely a complex cyst. This could be further evaluated with nonemergent ultrasound if indicated clinically.               Cervical spine CT w/o contrast   Final Result   IMPRESSION:   HEAD CT:   1.  No CT evidence for acute intracranial process.   2.  Brain atrophy and presumed chronic microvascular ischemic changes as above.      CERVICAL SPINE CT:   1.  No CT evidence for acute fracture or post traumatic subluxation.   2.  Mild bilateral C3-C4 neural foraminal stenosis.      Head CT w/o contrast   Final Result   IMPRESSION:   HEAD CT:   1.  No CT evidence for acute intracranial process.   2.  Brain atrophy and presumed chronic microvascular ischemic changes as above.      CERVICAL SPINE CT:   1.  No CT evidence for acute fracture or post traumatic subluxation.   2.  Mild bilateral C3-C4 neural foraminal stenosis.            EKG:    Performed at: 13:25p    Impression: Atrial fibrillation at 93 bpm.  Flipped T waves noted in lead III, aVR, aVF and V1.  QRS 64 ms,  ms.   Overall nonspecific ST changes compared to EKG from June 16, 2015.    I have independently reviewed and interpreted the EKG(s) documented above.        PROCEDURES:  none    The Lactic acid level is elevated due to dehydration, at this time there is no sign of severe sepsis or septic shock.        I, Kierra Liriano, am serving as a scribe to document services personally performed by Dr. Estelita Newby based on my observation and the provider's statements to me. I, Dr. Estelita Newby MD attest that Kierra Liriano is acting in a scribe capacity, has observed my performance of the services and has documented them in accordance with my direction.        Estelita Newby M.D. [facility]  Emergency Medicine and Medical Toxicology  [facility]ly [facility] EMERGENCY ROOM  [address] [address]  [phone]  Dept: [phone]           Estelita Newby MD  02/12/22 3433

## 2022-02-13 ENCOUNTER — APPOINTMENT (OUTPATIENT)
Dept: PHYSICAL THERAPY | Facility: CLINIC | Age: 82
DRG: 557 | End: 2022-02-13
Attending: HOSPITALIST
Payer: MEDICARE

## 2022-02-13 ENCOUNTER — APPOINTMENT (OUTPATIENT)
Dept: OCCUPATIONAL THERAPY | Facility: CLINIC | Age: 82
DRG: 557 | End: 2022-02-13
Attending: HOSPITALIST
Payer: MEDICARE
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
## 2022-02-13 LAB
ANION GAP SERPL CALCULATED.3IONS-SCNC: 10 MMOL/L (ref 5–18)
BUN SERPL-MCNC: 29 MG/DL (ref 8–28)
CALCIUM SERPL-MCNC: 9 MG/DL (ref 8.5–10.5)
CHLORIDE BLD-SCNC: 112 MMOL/L (ref 98–107)
CK SERPL-CCNC: 3738 U/L (ref 30–190)
CO2 SERPL-SCNC: 20 MMOL/L (ref 22–31)
CREAT SERPL-MCNC: 0.98 MG/DL (ref 0.6–1.1)
GFR SERPL CREATININE-BSD FRML MDRD: 57 ML/MIN/1.73M2
GLUCOSE BLD-MCNC: 102 MG/DL (ref 70–125)
HOLD SPECIMEN: NORMAL
HOLD SPECIMEN: NORMAL
INR PPP: 4.74 (ref 0.85–1.15)
MAGNESIUM SERPL-MCNC: 1.9 MG/DL (ref 1.8–2.6)
POTASSIUM BLD-SCNC: 4.2 MMOL/L (ref 3.5–5)
SODIUM SERPL-SCNC: 142 MMOL/L (ref 136–145)

## 2022-02-13 PROCEDURE — 250N000013 HC RX MED GY IP 250 OP 250 PS 637: Performed by: HOSPITALIST

## 2022-02-13 PROCEDURE — 97116 GAIT TRAINING THERAPY: CPT | Mod: GP | Performed by: PHYSICAL THERAPIST

## 2022-02-13 PROCEDURE — 97162 PT EVAL MOD COMPLEX 30 MIN: CPT | Mod: GP | Performed by: PHYSICAL THERAPIST

## 2022-02-13 PROCEDURE — 258N000003 HC RX IP 258 OP 636: Performed by: HOSPITALIST

## 2022-02-13 PROCEDURE — 97530 THERAPEUTIC ACTIVITIES: CPT | Mod: GP | Performed by: PHYSICAL THERAPIST

## 2022-02-13 PROCEDURE — 250N000011 HC RX IP 250 OP 636: Performed by: HOSPITALIST

## 2022-02-13 PROCEDURE — 36415 COLL VENOUS BLD VENIPUNCTURE: CPT | Performed by: HOSPITALIST

## 2022-02-13 PROCEDURE — 96376 TX/PRO/DX INJ SAME DRUG ADON: CPT

## 2022-02-13 PROCEDURE — 99233 SBSQ HOSP IP/OBS HIGH 50: CPT | Performed by: HOSPITALIST

## 2022-02-13 PROCEDURE — 82550 ASSAY OF CK (CPK): CPT | Performed by: HOSPITALIST

## 2022-02-13 PROCEDURE — 83735 ASSAY OF MAGNESIUM: CPT | Performed by: HOSPITALIST

## 2022-02-13 PROCEDURE — 97166 OT EVAL MOD COMPLEX 45 MIN: CPT | Mod: GO

## 2022-02-13 PROCEDURE — 85610 PROTHROMBIN TIME: CPT | Performed by: HOSPITALIST

## 2022-02-13 PROCEDURE — 120N000001 HC R&B MED SURG/OB

## 2022-02-13 PROCEDURE — G0378 HOSPITAL OBSERVATION PER HR: HCPCS

## 2022-02-13 PROCEDURE — 80048 BASIC METABOLIC PNL TOTAL CA: CPT | Performed by: HOSPITALIST

## 2022-02-13 PROCEDURE — 97535 SELF CARE MNGMENT TRAINING: CPT | Mod: GO

## 2022-02-13 PROCEDURE — 96361 HYDRATE IV INFUSION ADD-ON: CPT

## 2022-02-13 RX ORDER — MAGNESIUM SULFATE HEPTAHYDRATE 40 MG/ML
2 INJECTION, SOLUTION INTRAVENOUS ONCE
Status: COMPLETED | OUTPATIENT
Start: 2022-02-13 | End: 2022-02-13

## 2022-02-13 RX ADMIN — ANASTROZOLE 1 MG: 1 TABLET ORAL at 08:25

## 2022-02-13 RX ADMIN — SULFAMETHOXAZOLE AND TRIMETHOPRIM 1 TABLET: 800; 160 TABLET ORAL at 08:25

## 2022-02-13 RX ADMIN — MAGNESIUM SULFATE HEPTAHYDRATE 2 G: 40 INJECTION, SOLUTION INTRAVENOUS at 08:20

## 2022-02-13 RX ADMIN — SODIUM CHLORIDE: 9 INJECTION, SOLUTION INTRAVENOUS at 08:20

## 2022-02-13 RX ADMIN — ATENOLOL 25 MG: 25 TABLET ORAL at 08:25

## 2022-02-13 RX ADMIN — ACETAMINOPHEN 650 MG: 325 TABLET, FILM COATED ORAL at 10:43

## 2022-02-13 RX ADMIN — SULFAMETHOXAZOLE AND TRIMETHOPRIM 1 TABLET: 800; 160 TABLET ORAL at 19:43

## 2022-02-13 RX ADMIN — Medication 25 MCG: at 08:25

## 2022-02-13 RX ADMIN — SODIUM CHLORIDE: 9 INJECTION, SOLUTION INTRAVENOUS at 19:18

## 2022-02-13 ASSESSMENT — ACTIVITIES OF DAILY LIVING (ADL)
ADLS_ACUITY_SCORE: 11

## 2022-02-13 NOTE — UTILIZATION REVIEW
Admission Status; Secondary Review Determination   Under the authority of the Utilization Management Committee, the utilization review process indicated a secondary review on Cherelle Valentine. The review outcome is based on review of the medical records, discussions with staff, and applying clinical experience noted on the date of the review.   (x) Inpatient Status Appropriate - This patient's medical care is consistent with medical management for inpatient care and reasonable inpatient medical practice.     RATIONALE FOR DETERMINATION   82 yr old female with Afib on anticoagulation, HTN, HLD presented with unwitnessed fall and acute metabolic encephalopathy.  Also noted KATLYN with rhabdomyolysis with CPK>3700.  Despite IVF overnight, KATLYN improved but CPK still > 3700 and ongoing IVF needed.  Close monitoring of CPK and electrolytes and treating UTI in addition.      At the time of admission with the information available to the attending physician more than 2 nights Hospital complex care was anticipated, based on patient risk of adverse outcome if treated as outpatient and complex care required. Inpatient admission is appropriate based on the Medicare guidelines.   The information on this document is developed by the utilization review team in order for the business office to ensure compliance. This only denotes the appropriateness of proper admission status and does not reflect the quality of care rendered.   The definitions of Inpatient Status and Observation Status used in making the determination above are those provided in the CMS Coverage Manual, Chapter 1 and Chapter 6, section 70.4.   Sincerely,   Sheila Wells MD  Utilization Review  Physician Advisor  [facility]

## 2022-02-13 NOTE — PHARMACY-ANTICOAGULATION SERVICE
Clinical Pharmacy - Warfarin Dosing Consult     Pharmacy has been consulted to manage this patient s warfarin therapy.  Indication: Atrial Fibrillation  Therapy Goal: INR 2-3  Provider/Team: Dr Santa  Warfarin Prior to Admission: Yes  Warfarin PTA Regimen: 2.5mg daily  Significant drug interactions: Septra added upon admission 2/12/22  Recent documented change in oral intake/nutrition: Unknown  Dose Comments: no dose 2/12    INR   Date Value Ref Range Status   02/12/2022 4.10 (H) 0.85 - 1.15 Final       Recommend warfarin no dose 2/12.  Pharmacy will monitor Cherelle Valentine daily and order warfarin doses to achieve specified goal.      Please contact pharmacy as soon as possible if the warfarin needs to be held for a procedure or if the warfarin goals change.

## 2022-02-13 NOTE — PLAN OF CARE
Problem: UTI (Urinary Tract Infection)  Goal: Improved Infection Symptoms  Outcome: Improving     Problem: Fall Injury Risk  Goal: Absence of Fall and Fall-Related Injury  Outcome: Improving     Remains on PO abx. Repositioned q 2hrs. Bed alarm on for pt safety.

## 2022-02-13 NOTE — PROGRESS NOTES
[facility]      OUTPATIENT PHYSICAL THERAPY EVALUATION  PLAN OF TREATMENT FOR OUTPATIENT REHABILITATION  (COMPLETE FOR INITIAL CLAIMS ONLY)  Patient's Last Name, First Name, M.I.  YOB: 1940  Cherelle Valentine                        Provider's Name  [facility] Medical Record No.  [id number]                               Onset Date:  02/11/22   Start of Care Date:  (P) 02/13/22      Type:     _X_PT   ___OT   ___SLP Medical Diagnosis:  (P) acute kidney injury, rhabdo, fall                         PT Diagnosis:  (P) impaired functional mobility   Visits from SOC:  1   _________________________________________________________________________________  Plan of Treatment/Functional Goals    Planned Interventions: (P) bed mobility training,home exercise program,strengthening,transfer training     Goals: See Physical Therapy Goals on Care Plan in [employer] electronic health record.    Therapy Frequency: (P) Daily  Predicted Duration of Therapy Intervention: (P) 3 days  _________________________________________________________________________________    I CERTIFY THE NEED FOR THESE SERVICES FURNISHED UNDER        THIS PLAN OF TREATMENT AND WHILE UNDER MY CARE     (Physician co-signature of this document indicates review and certification of the therapy plan).                Certification date from: (P) 02/13/22, Certification date to: (P) 03/13/22    Referring Physician: MD Denise            Initial Assessment        See Physical Therapy evaluation dated (P) 02/13/22 in Epic electronic health record.

## 2022-02-13 NOTE — PLAN OF CARE
PRIMARY DIAGNOSIS: Traumatic rhabdomyolysis     OUTPATIENT/OBSERVATION GOALS TO BE MET BEFORE DISCHARGE  1. Orthostatic performed: N/A     2. Tolerating PO medications: Yes     3. Return to near baseline physical activity and mental status: walking with gaitbelt and stand by assistance. Both PT and OT consulted. Still demonstrating some confusion. Knows she is in the hospital, knows its February but seems to have a harder time grasping the entire situation. I do note she has some word-finding difficulty but per Cherelle, this has been happening for the last couple of years.      4. Cleared for discharge by consultants (if involved): No       Continuing with IV fluids, replacing Magnesium with 2g of IV Mag.

## 2022-02-13 NOTE — PROGRESS NOTES
[facility]    Medicine Progress Note - Hospitalist Service    Date of Admission:  2/12/2022    Assessment & Plan          Cherelle Valentine is a 82 year old old female with history of afib on anticoagulation, HTN, HLD presented with unwitnessed fall, acute metabolic encephalopathy, uncomplicated urinary tract infection, KATLYN and rhabdomyolysis. Not tachycardic or hypotensive at all since presentation and remains so. Lactic acid noted at borderline 2.2 in setting of dehydration, no oral intake while on the ground and with hypovolemic KATLYN and rhabdomyolysis; aggressive IVF hydration ordered. Also noted penicillin and cephalosporin severe alergies; discussed with pharmacy who agreed with Bactrim. Did discuss all of these - code status, UTI, antibiotics allergies and treating and monitoring closely with Bactrim, rhabdomyolysis, plan for PT/OT evals, etc - with her son and mPOA at bedside, Marlon. Confirmed full code status for now via mPOA/family proxy and he stated he is not aware of any other wishes previously stated or documented/forms.     Rhabdomyolysis has not downtrended yet; continue IVF today and continue to monitor renal function. CK level tomorrow. PT and OT in meantime, CM/SW re placement if indicated.     Acute Cystitis - Urinary Tract Infection  Secondary acute metabolic encephalopathy - 2/2 UTI, KATLYN/dehydration  -As above; d/w pharmacy. Order Bactrim and follow-up urine culture speciation/sensitivities to tailor antibiotics.   -No hypotension, no tachycardia. No fever. Uncomplicated UTI.   -Delirium precautions.   -3-day course indicated for Bactrim.     KATLYN  Rhabdomyolysis  Lactic Acidosis  -By chart review, history, physical exam, and labs, most likely etiology is azotemia, hypovolemia from down on ground for 12 or more hours (but less than 24- hrs) and no intake while down.   -Hold potentially nephrogenic or nephrotoxic medications.   -Administer IVF bolus now and initiate maintenance  "IVF.  -If not improved or normalized, consider further evaluation with FeNa and renal ultrasound.   -CK level and BMP with Cr still pending this morning; follow-up results.Update: levels of CK has NOT downtrended yet at all; repeat CK tomorrow AM. IVF re-ordered and encourage oral intake.     Fall  -Fall precautions  -PT, OT evals  -CM/SW cs  -D/w son/Truong Mason already who is in full support of higher needs/level of cares if determined to be the case    HTN  -Order home medications and as needed apply specified hold parameters.     HLD  -Order home statin.    Wounds/Abrasions from fall  -Superficial  -RN care; page MD if further prescribed ointments/gauze/orders needed    Incidental renal cyst and ovarian cyst  -Thoroughly discussed with her son/Truong Mason who stated her PCP is already aware of and following these.   -Outpatient PCP follow-up.   -Recommend repeat  Imaging later with ultrasound to better characterize; offered inpatient eval and again mPOA/son stated her PCP already is aware and can follow-up longitudinally         # Hypomagnesemia: Mg = 1.5 mg/dL (Ref range: 1.8 - 2.6 mg/dL) on admission, will replace as needed    # Obesity: Estimated body mass index is 32.42 kg/m  as calculated from the following:    Height as of this encounter: 1.6 m (5' 3\").    Weight as of this encounter: 83 kg (183 lb).        Diet: Low Saturated Fat Na <2400 mg    DVT Prophylaxis: DOAC  Steele Catheter: Not present  Central Lines: None  Cardiac Monitoring: None  Code Status: Full Code      Disposition Plan   Expected Discharge: 1-2 days, possible to TCU pending PT/OT evals, IV antibioticcs     The patient's care was discussed with the Patient and Patient's Family.    Kiet Walker MD  Hospitalist Service  [facility]  Securely message with the Vocera Web Console (learn more here)  Text page via P21 Paging/Directory         Clinically Significant Risk Factors Present on Admission              # " "Coagulation Defect: home medication list includes an anticoagulant medication    # Obesity: Estimated body mass index is 30.04 kg/m  as calculated from the following:    Height as of this encounter: 1.626 m (5' 4\").    Weight as of this encounter: 79.4 kg (175 lb).      ______________________________________________________________________    Interval History   No acute events overnight.    No chest pain, no shortness of breath. No other new complaints.  Discussed her CK and BMP results with her. Discussed plan of care with patient. Answered all questions to patient's verbalized and stated understanding and satisfaction.    Data reviewed today: I reviewed all medications, new labs and imaging results over the last 24 hours. I personally reviewed no images or EKG's today.    Physical Exam   Vital Signs: Temp: 97.7  F (36.5  C) Temp src: Oral BP: 113/55 Pulse: 99   Resp: 15 SpO2: 95 % O2 Device: None (Room air)    Weight: 175 lbs 0 oz  GENERAL:  Alert, appears comfortable, in no acute distress, appears stated age, on room air   HEAD:  Normocephalic, without obvious abnormality, atraumatic   EYES:  Conjunctiva/corneas clear, no scleral icterus, EOM's appears intact grossly   NOSE: Nares normal, septum midline, mucosa normal, no drainage   THROAT: Lips, mucosa, and tongue appear normal   NECK: Symmetrical, trachea appears midline   BACK:   Symmetric, no curvature noted   LUNGS:   Symmetric chest rise on inhalation, respirations unlabored   CHEST WALL:  No apparent deformity   HEART:  No thrills or heaves visualized   ABDOMEN:   No masses or organomegaly apparent; non-scaphoid   EXTREMITIES: Extremities appear normal, atraumatic, no cyanosis   SKIN: No exanthems in the visualized areas   NEURO: Alert and improved orientation and attention, moves all four extremities freely and spontaneously   PSYCH: Cooperative, behavior is appropriate         Data   Recent Labs   Lab 02/12/22  1415 02/12/22  1347   WBC  --  8.6   HGB  " --  14.4   MCV  --  104*   PLT  --  221   INR  --  4.10*   NA  --  140   POTASSIUM  --  4.2   CHLORIDE  --  111*   CO2  --  19*   BUN  --  28   CR 1.2* 1.22*   ANIONGAP  --  10   TIANA  --  9.3   GLC  --  125     Recent Results (from the past 24 hour(s))   Head CT w/o contrast    Narrative    EXAM: EXAM: CT HEAD W/O CONTRAST, CT CERVICAL SPINE W/O CONTRAST  LOCATION: [facility]  DATE/TIME: 2/12/2022 2:15 PM    INDICATION: Traumatic head and cervical spine injury.  COMPARISON: None.  TECHNIQUE:   1) Routine CT Head without IV contrast. Multiplanar reformats. Dose reduction techniques were used.  2) Routine CT Cervical Spine without IV contrast. Multiplanar reformats. Dose reduction techniques were used.    FINDINGS:   HEAD CT:   INTRACRANIAL CONTENTS: No intracranial hemorrhage, extraaxial collection, or mass effect.  No CT evidence of acute infarct. Mild presumed chronic small vessel ischemic changes. Moderate generalized volume loss. No hydrocephalus.     VISUALIZED ORBITS/SINUSES/MASTOIDS: No intraorbital abnormality. No paranasal sinus mucosal disease. No middle ear or mastoid effusion.    BONES/SOFT TISSUES: No acute abnormality.    CERVICAL SPINE CT:   VERTEBRA: Normal vertebral body heights and alignment. No fracture or posttraumatic subluxation.     CANAL/FORAMINA: Mild bilateral C3-C4 neural foraminal stenoses. No spinal canal stenosis.    PARASPINAL: No extraspinal abnormality. Visualized lung fields are clear.      Impression    IMPRESSION:  HEAD CT:  1.  No CT evidence for acute intracranial process.  2.  Brain atrophy and presumed chronic microvascular ischemic changes as above.    CERVICAL SPINE CT:  1.  No CT evidence for acute fracture or post traumatic subluxation.  2.  Mild bilateral C3-C4 neural foraminal stenosis.   Cervical spine CT w/o contrast    Narrative    EXAM: EXAM: CT HEAD W/O CONTRAST, CT CERVICAL SPINE W/O CONTRAST  LOCATION: [facility]  DATE/TIME: 2/12/2022 2:15 PM    INDICATION: Traumatic head and cervical spine injury.  COMPARISON: None.  TECHNIQUE:   1) Routine CT Head without IV contrast. Multiplanar reformats. Dose reduction techniques were used.  2) Routine CT Cervical Spine without IV contrast. Multiplanar reformats. Dose reduction techniques were used.    FINDINGS:   HEAD CT:   INTRACRANIAL CONTENTS: No intracranial hemorrhage, extraaxial collection, or mass effect.  No CT evidence of acute infarct. Mild presumed chronic small vessel ischemic changes. Moderate generalized volume loss. No hydrocephalus.     VISUALIZED ORBITS/SINUSES/MASTOIDS: No intraorbital abnormality. No paranasal sinus mucosal disease. No middle ear or mastoid effusion.    BONES/SOFT TISSUES: No acute abnormality.    CERVICAL SPINE CT:   VERTEBRA: Normal vertebral body heights and alignment. No fracture or posttraumatic subluxation.     CANAL/FORAMINA: Mild bilateral C3-C4 neural foraminal stenoses. No spinal canal stenosis.    PARASPINAL: No extraspinal abnormality. Visualized lung fields are clear.      Impression    IMPRESSION:  HEAD CT:  1.  No CT evidence for acute intracranial process.  2.  Brain atrophy and presumed chronic microvascular ischemic changes as above.    CERVICAL SPINE CT:  1.  No CT evidence for acute fracture or post traumatic subluxation.  2.  Mild bilateral C3-C4 neural foraminal stenosis.   CT Chest Abdomen Pelvis w/o Contrast    Narrative    EXAM: CT CHEST ABDOMEN PELVIS W/O CONTRAST  LOCATION: [facility]  DATE/TIME: 2/12/2022 2:17 PM    INDICATION: Status post fall, on blood thinners, weakness  COMPARISON: None.  TECHNIQUE: CT scan of the chest, abdomen, and pelvis was performed without IV contrast. Multiplanar reformats were obtained. Dose reduction techniques were used.   CONTRAST: None.    FINDINGS:   LUNGS AND PLEURA: There is linear atelectasis or scarring in the anterior right upper lobe, right middle  lobe, the lingula, and both lower lobes. Mild groundglass opacity is also seen in the left lower lobe likely infectious or inflammatory. There is   tubular bronchiectasis in the left lower lobe. No pneumothorax or pleural effusion.    MEDIASTINUM/AXILLAE: Atherosclerotic change of the aorta. There is mild aneurysmal dilatation of the ascending thoracic aorta measuring 4.2 x 4.1 cm. No evidence for mediastinal hemorrhage. No adenopathy. There is cardiomegaly with enlargement of the   left atrium. No pericardial effusion. Small hiatal hernia.    CORONARY ARTERY CALCIFICATION: Moderate.    HEPATOBILIARY: Multiple low-attenuation lesions the liver most compatible with cysts. Normal gallbladder.    PANCREAS: Normal.    SPLEEN: Calcified granulomas in the spleen. Low-attenuation lesions are seen in the spleen likely cysts but incompletely characterized.    ADRENAL GLANDS: Normal.    KIDNEYS/BLADDER: Hyperdense lesion upper pole left kidney measuring 1 cm. This likely represents a cyst complicated by proteinaceous material or hemorrhage. However, it is incompletely assessed without contrast. No hydronephrosis. Normal ureters and   bladder.    BOWEL: Normal small bowel. Appendix not discretely visualized. Multiple colonic diverticula without active diverticulitis.    LYMPH NODES: No lymphadenopathy.    VASCULATURE: Atherosclerotic change of the aorta without aneurysm.    PELVIC ORGANS: Previous hysterectomy. 3.9 x 2.9 cm mildly complicated right ovarian cyst containing some wall thickening posteriorly. Follow-up with ultrasound is recommended for further assessment if indicated clinically. Normal left ovary. No free   fluid in the pelvis.    MUSCULOSKELETAL: Degenerative change in the spine, sternoclavicular joints right greater than left, the SI joints, and the hips.      Impression    IMPRESSION:  1.  No evidence for acute intra-abdominal or intrathoracic trauma. Sensitivity is decreased without IV contrast.  2.  Minimal  groundglass opacity in the left lower lobe may be infectious or inflammatory.  3.  Aneurysmal dilatation of the ascending thoracic aorta measuring 4.2 cm.  4.  Small hiatal hernia.  5.  Colonic diverticulosis.  6.  3.9 cm complex right ovarian cyst. Nonemergent ultrasound could be considered for further assessment if indicated clinically.  7.  Hyperdense lesion upper pole left kidney likely a complex cyst. This could be further evaluated with nonemergent ultrasound if indicated clinically.           Medications     - MEDICATION INSTRUCTIONS -       sodium chloride 100 mL/hr at 02/12/22 1846     Warfarin Therapy Reminder         anastrozole  1 mg Oral Daily     artificial tears   Both Eyes At Bedtime     atenolol  25 mg Oral Daily     [Held by provider] lisinopril  5 mg Oral QAM     pravastatin  40 mg Oral At Bedtime     sodium chloride (PF)  3 mL Intracatheter Q8H     sulfamethoxazole-trimethoprim  1 tablet Oral BID     cholecalciferol  25 mcg Oral Daily

## 2022-02-13 NOTE — PLAN OF CARE
Problem: Adult Inpatient Plan of Care  Goal: Plan of Care Review  Outcome: No Change  Goal: Absence of Hospital-Acquired Illness or Injury  Outcome: No Change     Problem: UTI (Urinary Tract Infection)  Goal: Improved Infection Symptoms  2/13/2022 0747 by Mey Fair RN  Outcome: No Change  2/13/2022 0139 by Mey Fair, RN  Outcome: Improving     Problem: Fall Injury Risk  Goal: Absence of Fall and Fall-Related Injury  2/13/2022 0747 by Mey Fair, RN  Outcome: No Change  2/13/2022 0139 by Mey Fair, RN  Outcome: Improving

## 2022-02-13 NOTE — PLAN OF CARE
PRIMARY DIAGNOSIS: GENERALIZED WEAKNESS    OUTPATIENT/OBSERVATION GOALS TO BE MET BEFORE DISCHARGE  1. Orthostatic performed: N/A    2. Tolerating PO medications: Yes    3. Return to near baseline physical activity: No    4. Cleared for discharge by consultants (if involved): No    Discharge Planner Nurse   Safe discharge environment identified: No - Pt lives at home Alone.  Barriers to discharge: Yes       Entered by: Agata Henry 02/12/2022 10:10 PM     Please review provider order for any additional goals.   Nurse to notify provider when observation goals have been met and patient is ready for discharge.    PT admitted from ED after being found on the floor at home. Pt's skin reddened from Knees up to chest/shoulders. Scrapes on elbows, knees, toes. IVF @ 100ml/hr. Bolus received at beginning of shift. Received PO Bactrim.   Mag protocol. Recheck in AM.   Bed alarm on for safety. All personal items taken home by son, Marlon, but he will bring items for her tomorrow.

## 2022-02-13 NOTE — PROGRESS NOTES
Care Management Follow Up    Length of Stay (days): 0    Expected Discharge Date:  02/15/2022     Concerns to be Addressed: infection, rhabdo        Patient plan of care discussed at interdisciplinary rounds: Yes    Anticipated Discharge Disposition: TCU vs home with support        Additional Information:  Patient admitted post mechanical fall at home, acute metabolic encephalopathy, UTI, Rhabdo.     Therapy recommending home with assist 24/7 vs TCU.    Assessment History:  Pt lives by herself in a private residence. She fell but doesn't remember falling. Per chart and family she has had cognitive decline and they were discussing different care options.Patient does not use a walker, she drives short distances and prepares her own meals, meds on own. Son Marlon is primary family contact.    Spoke to Marlon today 2/13. Reviewed various discharge options and plans.  He states there has been talk for long term plan to have patient live with him. He is not sure at this time what would be best TCU vs home with family support.  Of note, Mar[place] lives in [facility] [place].    Final discharge plan pending progression and recommendations.     Leti Cochran RN

## 2022-02-13 NOTE — PROGRESS NOTES
Occupational Therapy       02/13/22 0930   Quick Adds   Type of Visit Initial Occupational Therapy Evaluation   Living Environment   People in home alone   Current Living Arrangements   ([facility])   Home Accessibility no concerns   Living Environment Comments sons able to come by and help as needed, but pt reports that they do work full time.    Self-Care   Activity/Exercise/Self-Care Comment Pt reports independence with ADLs/IADLs, including driving and management   General Information   Onset of Illness/Injury or Date of Surgery 02/12/22   Referring Physician Kiet Wlaker   Patient/Family Therapy Goal Statement (OT) none stated   Cognitive Status Examination   Orientation Status person   Affect/Mental Status (Cognitive) confused   Cognitive Status Comments Recommend further cognitive assessment   Range of Motion Comprehensive   Comment, General Range of Motion BUE WFL   Strength Comprehensive (MMT)   Comment, General Manual Muscle Testing (MMT) Assessment BUE WFL   Transfers   Transfers sit-stand transfer   Sit-Stand Transfer   Sit-Stand [place] (Transfers) supervision;verbal cues   Assistive Device (Sit-Stand Transfers) walker, front-wheeled   Clinical Impression   Criteria for Skilled Therapeutic Interventions Met (OT) yes   OT Diagnosis decreased ADL independence   OT Problem List-Impairments impacting ADL activity tolerance impaired;cognition;mobility   Assessment of Occupational Performance 1-3 Performance Deficits   Identified Performance Deficits transfers, dressing   Planned Therapy Interventions (OT) ADL retraining;transfer training   Clinical Decision Making Complexity (OT) moderate complexity   Therapy Frequency (OT) Daily   Predicted Duration of Therapy 3 days   Risk & Benefits of therapy have been explained patient   OT Discharge Planning    OT Discharge Recommendation (DC Rec) Transitional Care Facility;Home with assist   OT Rationale for DC Rec Pt needs assit for safe mobility and ADLs;  demonstrates decreased safety awareness and insight into deficits, lives alone. If home, pt needs 24/7 supervision at this time.   Total Evaluation Time (Minutes)   Total Evaluation Time (Minutes) 5     ANETTE Shaw/ROBERT 2/13/2022

## 2022-02-14 ENCOUNTER — APPOINTMENT (OUTPATIENT)
Dept: OCCUPATIONAL THERAPY | Facility: CLINIC | Age: 82
DRG: 557 | End: 2022-02-14
Payer: MEDICARE

## 2022-02-14 LAB
ANION GAP SERPL CALCULATED.3IONS-SCNC: 6 MMOL/L (ref 5–18)
BUN SERPL-MCNC: 26 MG/DL (ref 8–28)
CALCIUM SERPL-MCNC: 8.3 MG/DL (ref 8.5–10.5)
CHLORIDE BLD-SCNC: 115 MMOL/L (ref 98–107)
CK SERPL-CCNC: 2260 U/L (ref 30–190)
CO2 SERPL-SCNC: 22 MMOL/L (ref 22–31)
CREAT SERPL-MCNC: 0.89 MG/DL (ref 0.6–1.1)
GFR SERPL CREATININE-BSD FRML MDRD: 64 ML/MIN/1.73M2
GLUCOSE BLD-MCNC: 95 MG/DL (ref 70–125)
HOLD SPECIMEN: NORMAL
HOLD SPECIMEN: NORMAL
INR PPP: 4.98 (ref 0.85–1.15)
MAGNESIUM SERPL-MCNC: 2 MG/DL (ref 1.8–2.6)
POTASSIUM BLD-SCNC: 4.2 MMOL/L (ref 3.5–5)
SODIUM SERPL-SCNC: 143 MMOL/L (ref 136–145)

## 2022-02-14 PROCEDURE — 97535 SELF CARE MNGMENT TRAINING: CPT | Mod: GO

## 2022-02-14 PROCEDURE — 258N000003 HC RX IP 258 OP 636: Performed by: HOSPITALIST

## 2022-02-14 PROCEDURE — 82550 ASSAY OF CK (CPK): CPT | Performed by: HOSPITALIST

## 2022-02-14 PROCEDURE — 97129 THER IVNTJ 1ST 15 MIN: CPT | Mod: GO

## 2022-02-14 PROCEDURE — 120N000001 HC R&B MED SURG/OB

## 2022-02-14 PROCEDURE — 36415 COLL VENOUS BLD VENIPUNCTURE: CPT | Performed by: HOSPITALIST

## 2022-02-14 PROCEDURE — 250N000013 HC RX MED GY IP 250 OP 250 PS 637: Performed by: HOSPITALIST

## 2022-02-14 PROCEDURE — 85610 PROTHROMBIN TIME: CPT | Performed by: HOSPITALIST

## 2022-02-14 PROCEDURE — 83735 ASSAY OF MAGNESIUM: CPT | Performed by: HOSPITALIST

## 2022-02-14 PROCEDURE — 99232 SBSQ HOSP IP/OBS MODERATE 35: CPT | Performed by: HOSPITALIST

## 2022-02-14 PROCEDURE — 82310 ASSAY OF CALCIUM: CPT | Performed by: HOSPITALIST

## 2022-02-14 PROCEDURE — 250N000011 HC RX IP 250 OP 636: Performed by: HOSPITALIST

## 2022-02-14 RX ORDER — SODIUM CHLORIDE 9 MG/ML
INJECTION, SOLUTION INTRAVENOUS CONTINUOUS
Status: ACTIVE | OUTPATIENT
Start: 2022-02-14 | End: 2022-02-15

## 2022-02-14 RX ORDER — MINERAL OIL/HYDROPHIL PETROLAT
OINTMENT (GRAM) TOPICAL 2 TIMES DAILY
Status: DISCONTINUED | OUTPATIENT
Start: 2022-02-14 | End: 2022-02-15 | Stop reason: HOSPADM

## 2022-02-14 RX ORDER — MAGNESIUM SULFATE HEPTAHYDRATE 40 MG/ML
2 INJECTION, SOLUTION INTRAVENOUS ONCE
Status: COMPLETED | OUTPATIENT
Start: 2022-02-14 | End: 2022-02-14

## 2022-02-14 RX ADMIN — ANASTROZOLE 1 MG: 1 TABLET ORAL at 08:10

## 2022-02-14 RX ADMIN — SULFAMETHOXAZOLE AND TRIMETHOPRIM 1 TABLET: 800; 160 TABLET ORAL at 21:22

## 2022-02-14 RX ADMIN — SODIUM CHLORIDE: 9 INJECTION, SOLUTION INTRAVENOUS at 12:58

## 2022-02-14 RX ADMIN — WHITE PETROLATUM: 1.75 OINTMENT TOPICAL at 21:56

## 2022-02-14 RX ADMIN — SULFAMETHOXAZOLE AND TRIMETHOPRIM 1 TABLET: 800; 160 TABLET ORAL at 08:09

## 2022-02-14 RX ADMIN — MAGNESIUM SULFATE HEPTAHYDRATE 2 G: 40 INJECTION, SOLUTION INTRAVENOUS at 12:58

## 2022-02-14 RX ADMIN — ATENOLOL 25 MG: 25 TABLET ORAL at 08:09

## 2022-02-14 RX ADMIN — Medication 25 MCG: at 08:09

## 2022-02-14 ASSESSMENT — ACTIVITIES OF DAILY LIVING (ADL)
ADLS_ACUITY_SCORE: 11
ADLS_ACUITY_SCORE: 13
ADLS_ACUITY_SCORE: 11
ADLS_ACUITY_SCORE: 10
ADLS_ACUITY_SCORE: 13
ADLS_ACUITY_SCORE: 11
ADLS_ACUITY_SCORE: 10
ADLS_ACUITY_SCORE: 10
ADLS_ACUITY_SCORE: 11
ADLS_ACUITY_SCORE: 13
ADLS_ACUITY_SCORE: 11
ADLS_ACUITY_SCORE: 11
ADLS_ACUITY_SCORE: 13
ADLS_ACUITY_SCORE: 11
ADLS_ACUITY_SCORE: 13
ADLS_ACUITY_SCORE: 13
ADLS_ACUITY_SCORE: 11
ADLS_ACUITY_SCORE: 11
ADLS_ACUITY_SCORE: 13
ADLS_ACUITY_SCORE: 10
ADLS_ACUITY_SCORE: 13
ADLS_ACUITY_SCORE: 13

## 2022-02-14 ASSESSMENT — MIFFLIN-ST. JEOR: SCORE: 1232.42

## 2022-02-14 NOTE — PROGRESS NOTES
[facility]    Medicine Progress Note - Hospitalist Service    Date of Admission:  2/12/2022    Assessment & Plan          Cherelle Valentine is a 82 year old old female with history of afib on anticoagulation, HTN, HLD presented with unwitnessed fall, acute metabolic encephalopathy, uncomplicated urinary tract infection, KATLYN and rhabdomyolysis. Not tachycardic or hypotensive at all since presentation and remains so. Lactic acid noted at borderline 2.2 in setting of dehydration, no oral intake while on the ground and with hypovolemic KATLYN and rhabdomyolysis; aggressive IVF hydration ordered. Also noted penicillin and cephalosporin severe alergies; discussed with pharmacy who agreed with Bactrim. Did discuss all of these - code status, UTI, antibiotics allergies and treating and monitoring closely with Bactrim, rhabdomyolysis, plan for PT/OT evals, etc - with her son and mPOA at bedside, Marlon. Confirmed full code status for now via mPOA/family proxy and he stated he is not aware of any other wishes previously stated or documented/forms.     Rhabdomyolysis has downtrended to 2,260; continue IVF today and continue to monitor renal function. CK level tomorrow. PT and OT in meantime, CM/SW re placement as TCU has now been recommended by OT. Team member to update patient's son/mPOA or her daughter later today.     Acute Cystitis - Urinary Tract Infection  Secondary acute metabolic encephalopathy - 2/2 UTI, KATLYN/dehydration  -As above; d/w pharmacy. Order Bactrim and follow-up urine culture speciation/sensitivities to tailor antibiotics.   -No hypotension, no tachycardia. No fever. Uncomplicated UTI.   -Delirium precautions.   -3-day course indicated for Bactrim, patient had last dose this morning     KATLYN  Rhabdomyolysis  Lactic Acidosis  -By chart review, history, physical exam, and labs, most likely etiology is azotemia, hypovolemia from down on ground for 12 or more hours (but less than 24- hrs) and no  "intake while down.   -Hold potentially nephrogenic or nephrotoxic medications.   -Administer IVF bolus now and initiate maintenance IVF. Reordered NS for 18-hours at gentle rate.   -If not improved or normalized, consider further evaluation with FeNa and renal ultrasound.   -CK has finally downtrended x1 to 2,260 but still quite high. Creatinine down to 0.86. Continue IV fluids today and recheck CK again tomorrow.     Fall  -Fall precautions  -PT, OT evals  -CM/SW cs  -D/w son/Trunog Mason already who is in full support of higher needs/level of cares if determined to be the case    HTN  -Order home medications and as needed apply specified hold parameters.     HLD  -Order home statin.    Wounds/Abrasions from fall  -Superficial  -RN care; page MD if further prescribed ointments/gauze/orders needed    Incidental renal cyst and ovarian cyst  -Thoroughly discussed with her son/Truong Mason who stated her PCP is already aware of and following these.   -Outpatient PCP follow-up.   -Recommend repeat  Imaging later with ultrasound to better characterize; offered inpatient eval and again mPOA/son stated her PCP already is aware and can follow-up longitudinally.    Hypomagnesemia: Mg = 1.5 mg/dL (Ref range: 1.8 - 2.6 mg/dL) on admission, will replace as needed      Obesity: Estimated body mass index is 32.42 kg/m  as calculated from the following:    Height as of this encounter: 1.6 m (5' 3\").    Weight as of this encounter: 83 kg (183 lb).        Diet: Low Saturated Fat Na <2400 mg    DVT Prophylaxis: DOAC  Steele Catheter: Not present  Central Lines: None  Cardiac Monitoring: None  Code Status: Full Code      Disposition Plan   Expected Discharge: 1-2 days, possibly to home with family support or TCU as recommended by OT    The patient's care was discussed with the Patient and Patient's Family.    Say Espinoza PA-S  PA student on Dr. Walker's team  [facility]  Securely message with the Vocera Web Console " "(learn more here)  Text page via [facility] Paging/Directory       I agree with the documentation and findings as written by Say Espinoza and our discussed and formulated assessment and plan. I was present with Say who participated in the service and documentation of the note. I have also personally verified the history and personally performed the physical exam and medical decision-making. I agree with the assessment and plan of care as documented in the note.     Kiet Walker MD  Internal Medicine  [facility]  [facility]  Phone: #[phone]          Clinically Significant Risk Factors Present on Admission              # Obesity: Estimated body mass index is 30.18 kg/m  as calculated from the following:    Height as of this encounter: 1.626 m (5' 4\").    Weight as of this encounter: 79.7 kg (175 lb 12.8 oz).      ______________________________________________________________________    Interval History   No acute events overnight.    No chest pain, no shortness of breath. No other new complaints.  Discussed her CK results with her. She verbalized understanding and has no further questions or concerns.     Data reviewed today: I reviewed all medications, new labs and imaging results over the last 24 hours. I personally reviewed no images or EKG's today.    Physical Exam   Vital Signs: Temp: 97.4  F (36.3  C) Temp src: Oral BP: 139/89 Pulse: 84   Resp: 12 SpO2: 95 % O2 Device: None (Room air)    Weight: 175 lbs 12.8 oz  GENERAL:  Alert, appears comfortable, in no acute distress, appears stated age, on room air   HEAD:  Normocephalic, without obvious abnormality, atraumatic   EYES:  Conjunctiva/corneas clear, no scleral icterus, EOM's appears intact grossly   NOSE: Nares normal, septum midline, mucosa normal, no drainage   THROAT: Lips, mucosa, and tongue appear normal   NECK: Symmetrical, trachea appears midline   BACK:   Symmetric, no curvature noted   LUNGS:   Symmetric chest rise on " inhalation, respirations unlabored   CHEST WALL:  No apparent deformity   HEART:  No thrills or heaves visualized   ABDOMEN:   No masses or organomegaly apparent; non-scaphoid   EXTREMITIES: Extremities appear normal, atraumatic, no cyanosis   SKIN: No exanthems in the visualized areas   NEURO: Alert and improved orientation and attention, moves all four extremities freely and spontaneously   PSYCH: Cooperative, behavior is appropriate         Data   Recent Labs   Lab 02/14/22  0435 02/13/22  0705 02/12/22  1415 02/12/22  1347   WBC  --   --   --  8.6   HGB  --   --   --  14.4   MCV  --   --   --  104*   PLT  --   --   --  221   INR 4.98* 4.74*  --  4.10*    142  --  140   POTASSIUM 4.2 4.2  --  4.2   CHLORIDE 115* 112*  --  111*   CO2 22 20*  --  19*   BUN 26 29*  --  28   CR 0.89 0.98 1.2* 1.22*   ANIONGAP 6 10  --  10   TIANA 8.3* 9.0  --  9.3   GLC 95 102  --  125     No results found for this or any previous visit (from the past 24 hour(s)).  Medications     - MEDICATION INSTRUCTIONS -       Warfarin Therapy Reminder         anastrozole  1 mg Oral Daily     artificial tears   Both Eyes At Bedtime     atenolol  25 mg Oral Daily     [Held by provider] lisinopril  5 mg Oral QAM     magnesium sulfate  2 g Intravenous Once     [Held by provider] pravastatin  40 mg Oral At Bedtime     sodium chloride (PF)  3 mL Intracatheter Q8H     sulfamethoxazole-trimethoprim  1 tablet Oral BID     cholecalciferol  25 mcg Oral Daily     warfarin-No DOSE today  1 each Does not apply no dose today (warfarin)

## 2022-02-14 NOTE — PROGRESS NOTES
Care Management Follow Up    Length of Stay (days): 1    Expected Discharge Date: 02/15/2022     Concerns to be Addressed:     Medical progression, discharge disposition  Patient plan of care discussed at interdisciplinary rounds: Yes    Anticipated Discharge Disposition:  Undetermined at this time     Anticipated Discharge Services:  none  Anticipated Discharge DME:  Per treatment team    Patient/family educated on Medicare website which has current facility and service quality ratings:  Not applicable at this time  Education Provided on the Discharge Plan:  yes  Patient/Family in Agreement with the Plan:  Awaiting call back from family    Referrals Placed by CM/SW:  None at this time  Private pay costs discussed: not applicable at this time    Additional Information:  ROC called and left a voice mail for Pt's son, Marlon, to review therapy recommendations and discuss discharge planning.  ROC requested call back.     9:57 AM  SW spoke with Pt's son, Marlon on the phone.  Marlon states that family has discussed plan and the goal is to have Pt at home with family support and 24 hour supervision.  After a few days of Pt being at her own home, family may transition to taking Pt to their home.  Marlon reports the long term plan is to transition Pt to custodial eventually.  Marlon will be at the hospital this afternoon.        GALA Wilkins

## 2022-02-14 NOTE — PLAN OF CARE
"Problem: Risk for Delirium  Goal: Improved Attention and Thought Clarity  Outcome: No Change   Able to state name and birthday, knows she is in the hospital but \"I can never remember what it's called.\" Knows it's February and the day of the week is  \"the day everyone goes to [facility].\" Cherelle has some word-finding difficulty but she reports she has struggled with that for a few years. She's able to engage logically in conversation and has called appropriately today.    Problem: UTI (Urinary Tract Infection)  Goal: Improved Infection Symptoms  Outcome: No Change   Urine orange and concentrated. Creatinine improved this morning. Continues to receive IVF at 100 ml/hr. Urine output lower. Bladder scan done and negative. BP's stable. Continue to monitor.     Problem: Fall Injury Risk  Goal: Absence of Fall and Fall-Related Injury  Outcome: No Change  Continues to be a high fall risk. A bit off balance when getting up to walk this evening but that improved after she was up and walking for a bit. Gaitbelt, walker and stand by assistance utilized to maintain safety. Bed/chair alarms utilized as well in case Cherelle forgets to call for assistance.     Changed to Inpatient Status today. 2g IV magnesium infused today per protocol - recheck in the AM.   "

## 2022-02-14 NOTE — PLAN OF CARE
Problem: Adult Inpatient Plan of Care  Goal: Plan of Care Review  Outcome: No Change     Problem: Fall Injury Risk  Goal: Absence of Fall and Fall-Related Injury  Outcome: No Change  Intervention: Promote Injury-Free Environment  Recent Flowsheet Documentation  Taken 2/14/2022 0100 by Mey Fair, RN  Safety Promotion/Fall Prevention:   activity supervised   assistive device/personal items within reach   bed alarm on   increased rounding and observation   nonskid shoes/slippers when out of bed   room near nurse's station     Problem: UTI (Urinary Tract Infection)  Goal: Improved Infection Symptoms  Outcome: Improving     Problem: Risk for Delirium  Goal: Improved Sleep  Outcome: Improving     No complaints of pain. Remains on PO abx. IV fluids d/c'd, saline locked. Up Ax1 to bathroom. When coming back from the bathroom this morning, PCA noted that pt's bilateral knees and R elbow had small amount of blood. Skin between chest and knees looks to be improved, no longer reddened and hot, appears pale/pink, warm, CDI. Pt rested for long intervals between nursing cares.

## 2022-02-15 VITALS
DIASTOLIC BLOOD PRESSURE: 86 MMHG | RESPIRATION RATE: 20 BRPM | WEIGHT: 175.8 LBS | BODY MASS INDEX: 30.01 KG/M2 | TEMPERATURE: 97.6 F | SYSTOLIC BLOOD PRESSURE: 139 MMHG | OXYGEN SATURATION: 95 % | HEIGHT: 64 IN | HEART RATE: 85 BPM

## 2022-02-15 LAB
ANION GAP SERPL CALCULATED.3IONS-SCNC: 3 MMOL/L (ref 5–18)
BUN SERPL-MCNC: 18 MG/DL (ref 8–28)
CALCIUM SERPL-MCNC: 8 MG/DL (ref 8.5–10.5)
CHLORIDE BLD-SCNC: 117 MMOL/L (ref 98–107)
CK SERPL-CCNC: 2053 U/L (ref 30–190)
CO2 SERPL-SCNC: 21 MMOL/L (ref 22–31)
CREAT SERPL-MCNC: 0.71 MG/DL (ref 0.6–1.1)
GFR SERPL CREATININE-BSD FRML MDRD: 83 ML/MIN/1.73M2
GLUCOSE BLD-MCNC: 90 MG/DL (ref 70–125)
HOLD SPECIMEN: NORMAL
INR PPP: 3.98 (ref 0.85–1.15)
MAGNESIUM SERPL-MCNC: 1.8 MG/DL (ref 1.8–2.6)
POTASSIUM BLD-SCNC: 3.9 MMOL/L (ref 3.5–5)
SODIUM SERPL-SCNC: 141 MMOL/L (ref 136–145)

## 2022-02-15 PROCEDURE — 99239 HOSP IP/OBS DSCHRG MGMT >30: CPT | Mod: GC | Performed by: HOSPITALIST

## 2022-02-15 PROCEDURE — 82310 ASSAY OF CALCIUM: CPT | Performed by: HOSPITALIST

## 2022-02-15 PROCEDURE — 82374 ASSAY BLOOD CARBON DIOXIDE: CPT | Performed by: HOSPITALIST

## 2022-02-15 PROCEDURE — 82550 ASSAY OF CK (CPK): CPT | Performed by: HOSPITALIST

## 2022-02-15 PROCEDURE — 258N000003 HC RX IP 258 OP 636: Performed by: HOSPITALIST

## 2022-02-15 PROCEDURE — 36415 COLL VENOUS BLD VENIPUNCTURE: CPT | Performed by: HOSPITALIST

## 2022-02-15 PROCEDURE — 250N000013 HC RX MED GY IP 250 OP 250 PS 637: Performed by: HOSPITALIST

## 2022-02-15 PROCEDURE — 85610 PROTHROMBIN TIME: CPT | Performed by: HOSPITALIST

## 2022-02-15 PROCEDURE — 83735 ASSAY OF MAGNESIUM: CPT | Performed by: HOSPITALIST

## 2022-02-15 RX ORDER — PRAVASTATIN SODIUM 40 MG
40 TABLET ORAL AT BEDTIME
Start: 2022-02-15 | End: 2024-01-11

## 2022-02-15 RX ORDER — MAGNESIUM SULFATE HEPTAHYDRATE 40 MG/ML
2 INJECTION, SOLUTION INTRAVENOUS ONCE
Status: DISCONTINUED | OUTPATIENT
Start: 2022-02-15 | End: 2022-02-15 | Stop reason: HOSPADM

## 2022-02-15 RX ORDER — SULFAMETHOXAZOLE/TRIMETHOPRIM 800-160 MG
1 TABLET ORAL 2 TIMES DAILY
Qty: 2 TABLET | Refills: 0 | Status: SHIPPED | OUTPATIENT
Start: 2022-02-15 | End: 2022-02-16

## 2022-02-15 RX ADMIN — SULFAMETHOXAZOLE AND TRIMETHOPRIM 1 TABLET: 800; 160 TABLET ORAL at 09:02

## 2022-02-15 RX ADMIN — ATENOLOL 25 MG: 25 TABLET ORAL at 09:02

## 2022-02-15 RX ADMIN — SODIUM CHLORIDE: 9 INJECTION, SOLUTION INTRAVENOUS at 00:32

## 2022-02-15 RX ADMIN — ANASTROZOLE 1 MG: 1 TABLET ORAL at 09:01

## 2022-02-15 RX ADMIN — WHITE PETROLATUM: 1.75 OINTMENT TOPICAL at 09:02

## 2022-02-15 RX ADMIN — Medication 25 MCG: at 09:02

## 2022-02-15 ASSESSMENT — ACTIVITIES OF DAILY LIVING (ADL)
ADLS_ACUITY_SCORE: 10
ADLS_ACUITY_SCORE: 12
ADLS_ACUITY_SCORE: 10
ADLS_ACUITY_SCORE: 12

## 2022-02-15 NOTE — PLAN OF CARE
Problem: Adult Inpatient Plan of Care  Goal: Plan of Care Review  Outcome: No Change     No acute changes today. IV fluids discontinued over night per order. Labs reviewed by MD today and IV fluids restarted this afternoon. Now infusing at 100 ml/hr. Another 2G of IV Magnesium given today per replacement protocol. No acute changes noted to mental status. Son Marlon by to visit today. Up walking in the halls throughout the day. Urine output remains on lower end but creatinine improved today.

## 2022-02-15 NOTE — PROGRESS NOTES
Care Management Discharge Note    Discharge Date: 02/15/2022       Discharge Disposition: Home    Discharge Services: None    Discharge DME: None    Discharge Transportation: family or friend will provide    Private pay costs discussed: Not applicable    PAS Confirmation Code:  Not applicable   Patient/family educated on Medicare website which has current facility and service quality ratings:  (not applicable)    Education Provided on the Discharge Plan:  Per treatment team  Persons Notified of Discharge Plans: patient and son  Patient/Family in Agreement with the Plan: yes    Handoff Referral Completed: not applicable at this time    Additional Information:  SW reviewed chart.  Pt will be discharging home with family supervision / support.  No CM needs identified.  No concerns identified by family.  Family transporting.    SW discussed home care with family on 2/14 and family did not feel as though it would be necessary as they will be able to provide 24 hour supervision and assistance.         GALA Wilkins

## 2022-02-15 NOTE — DISCHARGE SUMMARY
[facility] MEDICINE  DISCHARGE SUMMARY     Primary Care Physician: Rina Benson  Admission Date: 2/12/2022   Discharge Provider: Say BRICEÑO and Dr. Kiet Walker MD Discharge Date: 2/15/2022   Diet:   Active Diet and Nourishment Order   Procedures     Low Saturated Fat Na <2400 mg     Diet       Code Status: Full Code   Activity: DCACTIVITY: Activity as tolerated        Condition at Discharge: Stable     REASON FOR PRESENTATION(See Admission Note for Details)   Unwitnessed fall  Rhabdomyolysis  UTI    PRINCIPAL & ACTIVE DISCHARGE DIAGNOSES     Principal Problem:    Traumatic rhabdomyolysis, initial encounter (H)  Active Problems:    Hypertension    Hiatal hernia    Hypomagnesemia    Confusion    Rash    Renal mass    Acute UTI    Right ovarian cyst    Thoracic ascending aortic aneurysm (H)    KATLYN (acute kidney injury) (H)    Fall in home, initial encounter      PENDING LABS     Unresulted Labs Ordered in the Past 30 Days of this Admission     Date and Time Order Name Status Description    2/12/2022  3:16 PM Blood Culture Peripheral Blood Preliminary     2/12/2022  3:16 PM Blood Culture Peripheral Blood Preliminary     2/12/2022  3:15 PM Urine Culture Preliminary             PROCEDURES ( this hospitalization only)          RECOMMENDATIONS TO OUTPATIENT PROVIDER FOR F/U VISIT     Follow up with PCP within 7 days  Schedule ultrasound for renal and ovarian cysts with PCP for surveillance/monitoring   Restart Pravastatin on 2/20/2022    DISPOSITION     Home with home care    SUMMARY OF HOSPITAL COURSE:      Cherelle Valentine is a 82 year old old female with history of afib on anticoagulation, HTN, HLD who presented with an unwitnessed fall, acute metabolic encephalopathy, uncomplicated urinary tract infection, KATLYN and rhabdomyolysis.     She was treated with aggressive IV fluids and we held her lisinopril and pravastatin during her hospitalization. Her creatinine is now at baseline  and her CK has downtrended from 3789 on Sunday to 2053. She is tolerating oral fluids and a regular diet well. Her mental status has returned to baseline with treatment of UTI, which was treated with Bactrim due to her penicillin and cephalosporin allergies. Pharmacy followed and guided warfarin dosing, which was affected by Bactrim and accordingly adjusted warfarin. She has superficial abrasions on the extensor surfaces of her knees and elbows that have been treated by nursing and are healing well. An incidental renal and ovarian cyst was found on imaging that her son and Truong Marlon are aware of. Inpatient evaluation was offered and her son and Truong declined; they will follow up with this outpatient her PCP. Recommended imaging with ultrasound to better characterize the cysts. Her son and Truong was actively involved in her care. PT/OT have been working with her and she ambulates with assistance of 1. After family and patient declined TCU, she was discharged with home PT/OT and SW consults ordered, which apparently later patient/family also declined. PCP follow-up.     Discharge Medications with Med changes:     Current Discharge Medication List      START taking these medications    Details   sulfamethoxazole-trimethoprim (BACTRIM DS) 800-160 MG tablet Take 1 tablet by mouth 2 times daily for 1 day  Qty: 2 tablet, Refills: 0    Associated Diagnoses: Acute UTI         CONTINUE these medications which have CHANGED    Details   pravastatin (PRAVACHOL) 40 MG tablet Take 1 tablet (40 mg) by mouth At Bedtime Do not resume taking this until 5 days later after discharge.    Associated Diagnoses: Hyperlipidemia, unspecified hyperlipidemia type         CONTINUE these medications which have NOT CHANGED    Details   acetaminophen (TYLENOL) 500 MG tablet Take 500-1,000 mg by mouth every 6 hours as needed for pain      anastrozole (ARIMIDEX) 1 MG tablet Take 1 mg by mouth daily      atenolol (TENORMIN) 25 MG tablet [ATENOLOL  (TENORMIN) 25 MG TABLET] Take 25 mg by mouth daily.      cholecalciferol, vitamin D3, 1,000 unit tablet [CHOLECALCIFEROL, VITAMIN D3, 1,000 UNIT TABLET] Take 1,000 Units by mouth daily.      lisinopril (PRINIVIL,ZESTRIL) 5 MG tablet [LISINOPRIL (PRINIVIL,ZESTRIL) 5 MG TABLET] Take 5 mg by mouth every morning.       multivitamin with minerals (THERA-M) 9 mg iron-400 mcg Tab tablet Take 1 tablet by mouth At Bedtime Centrum Silver      warfarin ANTICOAGULANT (COUMADIN) 2.5 MG tablet Take 2.5 mg by mouth daily      White Petrolatum-Mineral Oil (REFRESH P.M.) OINT Place 1 each into both eyes At Bedtime                   Rationale for medication changes:      Finish bactrim rx for UTI, chosen given several severe (anaphylactic) antibiotic allergies  Hold Pravastatin for 5 more days  Restarted lisinopril for Hypertension  Pharmacy followed and guided warfarin dosing, which was affected by Bactrim and accordingly adjusted warfarin, including holding warfarin for next 2-3 days as detailed by pharmacy below.         Consults   Pharmacy warfarin    SOCIAL WORK IP CONSULT  PHARMACY TO DOSE WARFARIN  PHYSICAL THERAPY ADULT IP CONSULT  OCCUPATIONAL THERAPY ADULT IP CONSULT  CARE MANAGEMENT / SOCIAL WORK IP CONSULT    Immunizations given this encounter     Most Recent Immunizations   Administered Date(s) Administered     COVID-19,PF,Pfizer (12+ Yrs) 11/19/2021     FLUAD(HD)65+ QUAD 10/12/2021           Anticoagulation Information      Recent INR results:   Recent Labs   Lab 02/15/22  0515 02/14/22  0435 02/13/22  0705 02/12/22  1347   INR 3.98* 4.98* 4.74* 4.10*     Warfarin doses (if applicable) or name of other anticoagulant: Hold warfarin for 3 days total before restarting and adjusting based on next INR check (in 2-3 days), as discussed with pharmacy       SIGNIFICANT IMAGING FINDINGS     Results for orders placed or performed during the hospital encounter of 02/12/22   Head CT w/o contrast    Impression    IMPRESSION:  HEAD  CT:  1.  No CT evidence for acute intracranial process.  2.  Brain atrophy and presumed chronic microvascular ischemic changes as above.    CERVICAL SPINE CT:  1.  No CT evidence for acute fracture or post traumatic subluxation.  2.  Mild bilateral C3-C4 neural foraminal stenosis.   Cervical spine CT w/o contrast    Impression    IMPRESSION:  HEAD CT:  1.  No CT evidence for acute intracranial process.  2.  Brain atrophy and presumed chronic microvascular ischemic changes as above.    CERVICAL SPINE CT:  1.  No CT evidence for acute fracture or post traumatic subluxation.  2.  Mild bilateral C3-C4 neural foraminal stenosis.   CT Chest Abdomen Pelvis w/o Contrast    Impression    IMPRESSION:  1.  No evidence for acute intra-abdominal or intrathoracic trauma. Sensitivity is decreased without IV contrast.  2.  Minimal groundglass opacity in the left lower lobe may be infectious or inflammatory.  3.  Aneurysmal dilatation of the ascending thoracic aorta measuring 4.2 cm.  4.  Small hiatal hernia.  5.  Colonic diverticulosis.  6.  3.9 cm complex right ovarian cyst. Nonemergent ultrasound could be considered for further assessment if indicated clinically.  7.  Hyperdense lesion upper pole left kidney likely a complex cyst. This could be further evaluated with nonemergent ultrasound if indicated clinically.             SIGNIFICANT LABORATORY FINDINGS     Most Recent 3 CBC's:Recent Labs   Lab Test 02/12/22  1347   WBC 8.6   HGB 14.4   *        Most Recent 3 BMP's:Recent Labs   Lab Test 02/15/22  0515 02/14/22  0435 02/13/22  0705    143 142   POTASSIUM 3.9 4.2 4.2   CHLORIDE 117* 115* 112*   CO2 21* 22 20*   BUN 18 26 29*   CR 0.71 0.89 0.98   ANIONGAP 3* 6 10   TIANA 8.0* 8.3* 9.0   GLC 90 95 102     Most Recent 2 LFT's:No lab results found.  Most Recent 3 INR's:Recent Labs   Lab Test 02/15/22  0515 02/14/22  0435 02/13/22  0705   INR 3.98* 4.98* 4.74*       Discharge Orders        Home Care PT Referral  for Hospital Discharge      Home Care OT Referral for Hospital Discharge      Home Care Social Service Referral for Hospital Discharge      Reason for your hospital stay    Urinary tract infection, rhabdomyolysis, falling.     Follow-up and recommended labs and tests    Follow up with primary care provider, FATOUMATA OSULLIVAN, within 7 days for hospital follow- up.     Activity    Your activity upon discharge: activity as tolerated and no driving for today     MD face to face encounter    Documentation of Face to Face and Certification for Home Health Services    I certify that patient: Cherelle Valentine is under my care and that I, or a nurse practitioner or physician's assistant working with me, had a face-to-face encounter that meets the physician face-to-face encounter requirements with this patient on: 2/15/2022.    This encounter with the patient was in whole, or in part, for the following medical condition, which is the primary reason for home health care: 81 y/o F with HTN and HLD and mechanical fall and rhabdomyolysis and UTI with physical deconditioning.    I certify that, based on my findings, the following services are medically necessary home health services: Occupational Therapy, Physical Therapy, and Social Work.    My clinical findings support the need for the above services because: Occupational Therapy Services are needed to assess and treat cognitive ability and address ADL safety due to impairment in home safety and mobility. and Physical Therapy Services are needed to assess and treat the following functional impairments: mobility and home safety and ADLs.    Further, I certify that my clinical findings support that this patient is homebound (i.e. absences from home require considerable and taxing effort and are for medical reasons or [facility] services or infrequently or of short duration when for other reasons) because: Leaving home is medically contraindicated for the following reason(s): Infection  risk / immunocompromised state where it is safer for them to receive services in the home...    Based on the above findings. I certify that this patient is confined to the home and needs intermittent skilled nursing care, physical therapy and/or speech therapy.  The patient is under my care, and I have initiated the establishment of the plan of care.  This patient will be followed by a physician who will periodically review the plan of care.  Physician/Provider to provide follow up care: Rina Benson    Attending hospital physician (the Medicare certified [facility] provider): Kiet Walker MD  Physician Signature: See electronic signature associated with these discharge orders.  Date: 2/15/2022     Diet    Follow this diet upon discharge: Orders Placed This Encounter      Low Saturated Fat Na <2400 mg         Examination   Physical Exam   Temp:  [97.6  F (36.4  C)-98.2  F (36.8  C)] 97.6  F (36.4  C)  Pulse:  [80-85] 85  Resp:  [16-20] 20  BP: (120-142)/(71-86) 139/86  SpO2:  [95 %] 95 %  Wt Readings from Last 1 Encounters:   02/14/22 79.7 kg (175 lb 12.8 oz)       GENERAL:  Alert, appears comfortable, in no acute distress, appears stated age, on room air   HEAD:  Normocephalic, without obvious abnormality, atraumatic   EYES:  Conjunctiva/corneas clear, no scleral icterus, EOM's appears intact grossly   NOSE: Nares normal, septum midline, mucosa normal, no drainage   THROAT: Lips, mucosa, and tongue appear normal   NECK: Symmetrical, trachea appears midline   BACK:   Symmetric, no curvature noted   LUNGS:   Symmetric chest rise on inhalation, respirations unlabored   CHEST WALL:  No apparent deformity   HEART:  No thrills or heaves visualized   ABDOMEN:   No masses or organomegaly apparent; non-scaphoid   EXTREMITIES: Extremities appear normal, atraumatic, no cyanosis   SKIN: Superficial abrasions on extensor surfaces of knees and elbows bilaterally.    NEURO: Alert and improved orientation and attention to  endorsed baseline, moves all four extremities freely and spontaneously   PSYCH: Cooperative, behavior is appropriate        Please see EMR for more detailed significant labs, imaging, consultant notes etc.    I, Kiet Walker MD, personally saw the patient today and spent greater than 30 minutes discharging this patient.    Say Espinoza, PA-S  PA Student on Dr. Kiet Walker's Team  [facility]  Phone: #[phone]    CC:Rina Benson    I agree with the documentation and findings as written by Say Espinoza and our discussed and formulated assessment and plan. I was present with Say who participated in the service and documentation of the note. I have also personally verified the history and personally performed the physical exam and medical decision-making. I agree with the assessment and plan of care as documented in the note.    Kiet Walker MD  Internal Medicine  Hospitalist  [facility]  Phone: #[phone]

## 2022-02-15 NOTE — PROGRESS NOTES
Patient is sometimes confused about situation. VSS. NS still infusing at 100 ml/hr. Patient up to bathroom twice during the night to void 100 ml each time. Patient ambulates with gait belt and walker and assist of 1. Non-adherent dressing applied to right knee and wrapped in kerlix. Abrasions to right knee were bleeding while laying in bed. Patient reports wanting to go home today.

## 2022-02-15 NOTE — PLAN OF CARE
Physical Therapy Discharge Summary    Reason for therapy discharge:    Discharged to home. 24 hour assist/supervision from family    Progress towards therapy goal(s). See goals on Care Plan in [facility] electronic health record for goal details.  Goals not met.  Barriers to achieving goals:   discharge from facility.    Therapy recommendation(s):    No further therapy is recommended.

## 2022-02-15 NOTE — PLAN OF CARE
Occupational Therapy Discharge Summary    Reason for therapy discharge:    Discharged to home with family/24 hour supervision    Progress towards therapy goal(s). See goals on Care Plan in [facility] electronic health record for goal details.  Goals partially met.  Barriers to achieving goals:   discharge from facility.    Therapy recommendation(s):    Continued therapy is recommended.  Rationale/Recommendations:  Cognitive assessment .

## 2022-02-17 LAB
BACTERIA BLD CULT: NO GROWTH
BACTERIA BLD CULT: NO GROWTH
BACTERIA UR CULT: ABNORMAL

## 2022-03-09 LAB
ATRIAL RATE - MUSE: 300 BPM
DIASTOLIC BLOOD PRESSURE - MUSE: 94 MMHG
INTERPRETATION ECG - MUSE: NORMAL
P AXIS - MUSE: NORMAL DEGREES
PR INTERVAL - MUSE: NORMAL MS
QRS DURATION - MUSE: 64 MS
QT - MUSE: 358 MS
QTC - MUSE: 445 MS
R AXIS - MUSE: -18 DEGREES
SYSTOLIC BLOOD PRESSURE - MUSE: 131 MMHG
T AXIS - MUSE: -10 DEGREES
VENTRICULAR RATE- MUSE: 93 BPM

## 2023-12-28 ENCOUNTER — TRANSFERRED RECORDS (OUTPATIENT)
Dept: HEALTH INFORMATION MANAGEMENT | Facility: CLINIC | Age: 83
End: 2023-12-28
Payer: MEDICARE

## 2023-12-29 ENCOUNTER — TELEPHONE (OUTPATIENT)
Dept: OPHTHALMOLOGY | Facility: CLINIC | Age: 83
End: 2023-12-29
Payer: MEDICARE

## 2023-12-29 ENCOUNTER — TRANSCRIBE ORDERS (OUTPATIENT)
Dept: OTHER | Age: 83
End: 2023-12-29

## 2023-12-29 DIAGNOSIS — H02.214 CICATRICIAL LAGOPHTHALMOS LEFT UPPER EYELID: Primary | ICD-10-CM

## 2023-12-29 NOTE — TELEPHONE ENCOUNTER
[facility] Call Center    Phone Message    May a detailed message be left on voicemail: yes     Reason for Call: Appointment Intake    Referring Provider Name: Referred by: Brett Lozada MD  [facility]  [address], [address], [place] [address]  Ph: [phone], Fx: [phone]   Diagnosis and/or Symptoms: Cicatricial lagophthalmos left upper eyelid     This referral came over as Urgent for pt to be seen by Dr. Shelby within 3-5 days.    I called and spoke to pt's son Marlon and I offered the 02/20 appt, he said no pt needs to be seen within next cpl of days.  Please review, they only want the [facility] location    Action Taken: Message routed to:  [facility]    Travel Screening: Not Applicable

## 2024-01-02 NOTE — TELEPHONE ENCOUNTER
[facility] Call Center    Phone Message    May a detailed message be left on voicemail: yes     Reason for Call: Appointment Intake    Referring Provider Name: Dr. Lozada  Diagnosis and/or Symptoms: Pt's son Marlon is calling again to see why his mom (pt) hasn't been called to be seen.    He is asking for a call today    Action Taken: Message routed to:  [facility]    Travel Screening: Not Applicable

## 2024-01-02 NOTE — TELEPHONE ENCOUNTER
Can you please reach out to son and see if he can bring patient tomorrow to [facility] at 9:30? I held the time.  Thanks,  Pnig San RN RN 1:44 PM 01/02/24

## 2024-01-03 ENCOUNTER — OFFICE VISIT (OUTPATIENT)
Dept: OPHTHALMOLOGY | Facility: CLINIC | Age: 84
End: 2024-01-03
Payer: MEDICARE

## 2024-01-03 ENCOUNTER — TELEPHONE (OUTPATIENT)
Dept: OPHTHALMOLOGY | Facility: CLINIC | Age: 84
End: 2024-01-03

## 2024-01-03 DIAGNOSIS — H16.142 PUNCTATE KERATITIS OF LEFT EYE: Primary | ICD-10-CM

## 2024-01-03 DIAGNOSIS — H02.214 CICATRICIAL LAGOPHTHALMOS LEFT UPPER EYELID: ICD-10-CM

## 2024-01-03 PROCEDURE — 99204 OFFICE O/P NEW MOD 45 MIN: CPT | Performed by: OPHTHALMOLOGY

## 2024-01-03 PROCEDURE — 92285 EXTERNAL OCULAR PHOTOGRAPHY: CPT | Performed by: OPHTHALMOLOGY

## 2024-01-03 RX ORDER — LATANOPROST 50 UG/ML
1 SOLUTION/ DROPS OPHTHALMIC AT BEDTIME
COMMUNITY

## 2024-01-03 RX ORDER — ERYTHROMYCIN 5 MG/G
OINTMENT OPHTHALMIC 2 TIMES DAILY
COMMUNITY
Start: 2023-12-28 | End: 2024-01-16

## 2024-01-03 ASSESSMENT — VISUAL ACUITY
METHOD: SNELLEN - LINEAR
OD_SC+: -1
OD_SC: 20/30
OS_PH_CC: 20/100
OS_SC: 20/300
OS_PH_CC+: +1

## 2024-01-03 ASSESSMENT — CONF VISUAL FIELD
OS_SUPERIOR_TEMPORAL_RESTRICTION: 0
METHOD: COUNTING FINGERS
OD_SUPERIOR_TEMPORAL_RESTRICTION: 3
OS_SUPERIOR_NASAL_RESTRICTION: 0
OS_INFERIOR_TEMPORAL_RESTRICTION: 0
OS_NORMAL: 1
OS_INFERIOR_NASAL_RESTRICTION: 0

## 2024-01-03 ASSESSMENT — TONOMETRY
OD_IOP_MMHG: 12
OS_IOP_MMHG: 17
IOP_METHOD: ICARE

## 2024-01-03 NOTE — NURSING NOTE
Chief Complaints and History of Present Illnesses   Patient presents with    Lagophthalmos Evaluation     Chief Complaint(s) and History of Present Illness(es)       Lagophthalmos Evaluation              Laterality: left upper lid              Comments    Pt referred to clinic by Dr. Lozada for evaluation of lagophthalmos of left upper lid secondary to trauma. History of fall to head 11/2023 with 15 sutures that went from her forehead down to her left upper lid. CT scan was clear. She has been unable to fully close the eye causing exposure keratopathy. She has complaint of tearing, photophobia and vision blur left eye. 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 Ocular surgical history includes bilateral UL blepharoplasty, SLT each eye, and phaco with IOL and iStent each eye. Present drop therapy includes latanoprost igtt each eye at bedtime, Erythromycin jessi left eye BID, and Systane igtt each eye PRN. Son Marlon accompanies pt today.

## 2024-01-03 NOTE — PROGRESS NOTES
Chief Complaint(s) and History of Present Illness(es)     Lagophthalmos Evaluation            Laterality: left upper lid          Comments    Pt referred to clinic by Dr. Lozada for evaluation of lagophthalmos of left upper lid secondary to trauma. History of fall to head 11/2023 with 15 sutures that went from her forehead down to her left upper lid. CT scan was clear. She has been unable to fully close the eye causing exposure keratopathy. She has complaint of tearing, photophobia and vision blur left eye. 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 Ocular surgical history includes bilateral UL blepharoplasty, SLT each eye, and   phaco with IOL and iStent each eye. Present drop therapy includes   latanoprost igtt each eye at bedtime, Erythromycin jessi left eye BID, and Systane igtt each eye PRN. Son Marlon accompanies pt today.    Estelita Christian             Assessment & Plan     Cherelle SYEDA Valentine is a 83 year old female with the following diagnoses:   Encounter Diagnoses   Name Primary?    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    I encouraged her to aggressively lubricate her eyes with artificial tears and Dr. Lozada gave her ees jessi at bedtime which I encouraged her to use.    I recommend she massage her upper eyelid downward.     Schedule relatively urgent left upper eyelid cicatricial retraction repair with FTSG from by right or left ear.     She is on coumadin for Afib.  Would like to do MAC     Attending Physician Attestation: Complete documentation of historical and exam elements from today's encounter can be found in the full encounter summary report (not reduplicated in this progress note). I personally obtained the chief complaint(s) and history of present illness. I confirmed and edited as necessary the review of systems, past medical/surgical history, family history, social  history, and examination findings as documented by others; and I examined the patient myself. I personally reviewed the relevant tests, images, and reports as documented above. I formulated and edited as necessary the assessment and plan and discussed the findings and management plan with the patient.  -Ingris Shelby MD    Today with Cherelle Valentine  and her son, Marlon, I reviewed the indications, risks, benefits, and alternatives of the proposed surgical procedure including, but not limited to, failure obtain the desired result  and need for additional surgery, bleeding, infection, loss of vision, loss of the eye, skin graft failure, and the remote possibility of permanent damage to any organ system or death with the use of anesthesia.  I provided multiple opportunities for the questions, answered all questions to the best of my ability, and confirmed that my answers and my discussion were understood.   Ingris Shelby MD

## 2024-01-03 NOTE — TELEPHONE ENCOUNTER
Date Scheduled: 1-11-24  Facility: Surgery Locations: [facility]  Surgeon: Dr. Shelby   Post-op appointment scheduled: TBD-pt son to call back to schedule   scheduled?: No  Surgery packet/instructions confirmed received?  Yes  Pre op physical/PAC appointment: Molina Magana  Special Considerations:     Demetria Noe  Surgery Scheduling Coordinator  Ph: [phone]

## 2024-01-03 NOTE — LETTER
1/3/2024         RE:  :  MRN: Cherelle Valentine  1940  [id number]     Dear Dr. Brett Lozada,    Thank you for asking me to see your patient, Cherelle Valentine, for an oculoplastic   consultation.  My assessment and plan are below.  For further details, please see my attached clinic note.      Chief Complaint(s) and History of Present Illness(es)     Lagophthalmos Evaluation            Laterality: left upper lid          Comments    Pt referred to clinic by Dr. Lozada for evaluation of lagophthalmos of left upper lid secondary to trauma. History of fall to head 2023 with 15 sutures that went from her forehead down to her left upper lid. CT scan was clear. She has been unable to fully close the eye causing exposure keratopathy. She has complaint of tearing, photophobia and vision blur left eye. 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 Ocular surgical history includes bilateral UL blepharoplasty, SLT each eye, and   phaco with IOL and iStent each eye. Present drop therapy includes   latanoprost igtt each eye at bedtime, Erythromycin jessi left eye BID, and Systane igtt each eye PRN. Son Marlon accompanies pt today.    Estelita Christian             Assessment & Plan     Cherelle Valentine is a 83 year old female with the following diagnoses:   Encounter Diagnoses   Name Primary?     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    I encouraged her to aggressively lubricate her eyes with artificial tears and Dr. Lozada gave her ees jessi at bedtime which I encouraged her to use.    I recommend she massage her upper eyelid downward.     Schedule relatively urgent left upper eyelid cicatricial retraction repair with FTSG from by right or left ear.     She is on coumadin for Afib.  Would like to do MAC      Again, thank you for allowing me to participate in the care of your patient.      Sincerely,    Ingris  MD Afsaneh  Department of Ophthalmology and Visual Neurosciences  [facility]    CC: Brett Lozada MD  [facility]  [address] [address]  Via Fax: [phone]

## 2024-01-03 NOTE — TELEPHONE ENCOUNTER
Message left for the patient to call back to get surgery scheduled.     Demetria Noe  Surgery Scheduling Coordinator  Ph: [phone]

## 2024-01-10 ENCOUNTER — ANESTHESIA EVENT (OUTPATIENT)
Dept: SURGERY | Facility: AMBULATORY SURGERY CENTER | Age: 84
End: 2024-01-10
Payer: MEDICARE

## 2024-01-11 ENCOUNTER — ANESTHESIA (OUTPATIENT)
Dept: SURGERY | Facility: AMBULATORY SURGERY CENTER | Age: 84
End: 2024-01-11
Payer: MEDICARE

## 2024-01-11 ENCOUNTER — HOSPITAL ENCOUNTER (OUTPATIENT)
Facility: AMBULATORY SURGERY CENTER | Age: 84
Discharge: HOME | End: 2024-01-11
Attending: OPHTHALMOLOGY
Payer: MEDICARE

## 2024-01-11 VITALS
HEART RATE: 67 BPM | WEIGHT: 184 LBS | OXYGEN SATURATION: 96 % | RESPIRATION RATE: 18 BRPM | BODY MASS INDEX: 33.86 KG/M2 | TEMPERATURE: 96.8 F | DIASTOLIC BLOOD PRESSURE: 72 MMHG | HEIGHT: 62 IN | SYSTOLIC BLOOD PRESSURE: 140 MMHG

## 2024-01-11 PROCEDURE — 67875 CLOSURE OF EYELID BY SUTURE: CPT | Mod: LT

## 2024-01-11 PROCEDURE — 67911 REVISE EYELID DEFECT: CPT | Mod: E1

## 2024-01-11 PROCEDURE — 15769 GRFG AUTOL SOFT TISS DIR EXC: CPT

## 2024-01-11 PROCEDURE — 67911 REVISE EYELID DEFECT: CPT | Mod: E1 | Performed by: OPHTHALMOLOGY

## 2024-01-11 PROCEDURE — 15769 GRFG AUTOL SOFT TISS DIR EXC: CPT | Mod: GC | Performed by: OPHTHALMOLOGY

## 2024-01-11 PROCEDURE — 67875 CLOSURE OF EYELID BY SUTURE: CPT | Mod: LT | Performed by: OPHTHALMOLOGY

## 2024-01-11 PROCEDURE — 15004 WOUND PREP F/N/HF/G: CPT | Mod: GC | Performed by: OPHTHALMOLOGY

## 2024-01-11 RX ORDER — ONDANSETRON 2 MG/ML
4 INJECTION INTRAMUSCULAR; INTRAVENOUS EVERY 30 MIN PRN
Status: DISCONTINUED | OUTPATIENT
Start: 2024-01-11 | End: 2024-01-12 | Stop reason: HOSPADM

## 2024-01-11 RX ORDER — ONDANSETRON 2 MG/ML
4 INJECTION INTRAMUSCULAR; INTRAVENOUS EVERY 30 MIN PRN
Status: DISCONTINUED | OUTPATIENT
Start: 2024-01-11 | End: 2024-01-11 | Stop reason: HOSPADM

## 2024-01-11 RX ORDER — SODIUM CHLORIDE, SODIUM LACTATE, POTASSIUM CHLORIDE, CALCIUM CHLORIDE 600; 310; 30; 20 MG/100ML; MG/100ML; MG/100ML; MG/100ML
INJECTION, SOLUTION INTRAVENOUS CONTINUOUS
Status: DISCONTINUED | OUTPATIENT
Start: 2024-01-11 | End: 2024-01-11 | Stop reason: HOSPADM

## 2024-01-11 RX ORDER — ONDANSETRON 4 MG/1
4 TABLET, ORALLY DISINTEGRATING ORAL EVERY 30 MIN PRN
Status: DISCONTINUED | OUTPATIENT
Start: 2024-01-11 | End: 2024-01-12 | Stop reason: HOSPADM

## 2024-01-11 RX ORDER — LIDOCAINE HYDROCHLORIDE 20 MG/ML
INJECTION, SOLUTION INFILTRATION; PERINEURAL PRN
Status: DISCONTINUED | OUTPATIENT
Start: 2024-01-11 | End: 2024-01-11

## 2024-01-11 RX ORDER — ONDANSETRON 4 MG/1
4 TABLET, ORALLY DISINTEGRATING ORAL EVERY 30 MIN PRN
Status: DISCONTINUED | OUTPATIENT
Start: 2024-01-11 | End: 2024-01-11 | Stop reason: HOSPADM

## 2024-01-11 RX ORDER — FENTANYL CITRATE 50 UG/ML
25 INJECTION, SOLUTION INTRAMUSCULAR; INTRAVENOUS EVERY 5 MIN PRN
Status: DISCONTINUED | OUTPATIENT
Start: 2024-01-11 | End: 2024-01-11 | Stop reason: HOSPADM

## 2024-01-11 RX ORDER — FENTANYL CITRATE 50 UG/ML
50 INJECTION, SOLUTION INTRAMUSCULAR; INTRAVENOUS EVERY 5 MIN PRN
Status: DISCONTINUED | OUTPATIENT
Start: 2024-01-11 | End: 2024-01-11 | Stop reason: HOSPADM

## 2024-01-11 RX ORDER — PROPOFOL 10 MG/ML
INJECTION, EMULSION INTRAVENOUS PRN
Status: DISCONTINUED | OUTPATIENT
Start: 2024-01-11 | End: 2024-01-11

## 2024-01-11 RX ORDER — ACETAMINOPHEN 325 MG/1
975 TABLET ORAL ONCE
Status: COMPLETED | OUTPATIENT
Start: 2024-01-11 | End: 2024-01-11

## 2024-01-11 RX ORDER — LIDOCAINE 40 MG/G
CREAM TOPICAL
Status: DISCONTINUED | OUTPATIENT
Start: 2024-01-11 | End: 2024-01-11 | Stop reason: HOSPADM

## 2024-01-11 RX ORDER — HYDROMORPHONE HYDROCHLORIDE 1 MG/ML
0.2 INJECTION, SOLUTION INTRAMUSCULAR; INTRAVENOUS; SUBCUTANEOUS EVERY 5 MIN PRN
Status: DISCONTINUED | OUTPATIENT
Start: 2024-01-11 | End: 2024-01-11 | Stop reason: HOSPADM

## 2024-01-11 RX ORDER — ERYTHROMYCIN 5 MG/G
OINTMENT OPHTHALMIC 3 TIMES DAILY
Status: DISCONTINUED | OUTPATIENT
Start: 2024-01-11 | End: 2024-01-12 | Stop reason: HOSPADM

## 2024-01-11 RX ORDER — TETRACAINE HYDROCHLORIDE 5 MG/ML
SOLUTION OPHTHALMIC PRN
Status: DISCONTINUED | OUTPATIENT
Start: 2024-01-11 | End: 2024-01-11 | Stop reason: HOSPADM

## 2024-01-11 RX ORDER — OXYCODONE HYDROCHLORIDE 5 MG/1
10 TABLET ORAL
Status: DISCONTINUED | OUTPATIENT
Start: 2024-01-11 | End: 2024-01-12 | Stop reason: HOSPADM

## 2024-01-11 RX ORDER — HYDROMORPHONE HYDROCHLORIDE 1 MG/ML
0.4 INJECTION, SOLUTION INTRAMUSCULAR; INTRAVENOUS; SUBCUTANEOUS EVERY 5 MIN PRN
Status: DISCONTINUED | OUTPATIENT
Start: 2024-01-11 | End: 2024-01-11 | Stop reason: HOSPADM

## 2024-01-11 RX ORDER — OXYCODONE HYDROCHLORIDE 5 MG/1
5 TABLET ORAL
Status: DISCONTINUED | OUTPATIENT
Start: 2024-01-11 | End: 2024-01-12 | Stop reason: HOSPADM

## 2024-01-11 RX ADMIN — PROPOFOL 20 MG: 10 INJECTION, EMULSION INTRAVENOUS at 13:08

## 2024-01-11 RX ADMIN — PROPOFOL 50 MG: 10 INJECTION, EMULSION INTRAVENOUS at 13:06

## 2024-01-11 RX ADMIN — LIDOCAINE HYDROCHLORIDE 40 MG: 20 INJECTION, SOLUTION INFILTRATION; PERINEURAL at 13:03

## 2024-01-11 RX ADMIN — SODIUM CHLORIDE, SODIUM LACTATE, POTASSIUM CHLORIDE, CALCIUM CHLORIDE: 600; 310; 30; 20 INJECTION, SOLUTION INTRAVENOUS at 10:46

## 2024-01-11 RX ADMIN — ACETAMINOPHEN 975 MG: 325 TABLET ORAL at 10:46

## 2024-01-11 ASSESSMENT — ENCOUNTER SYMPTOMS: DYSRHYTHMIAS: 1

## 2024-01-11 NOTE — DISCHARGE INSTRUCTIONS
Post-operative Instructions  Ophthalmic Plastic and Reconstructive Surgery    Ingris Shelby M.D.     All instructions apply to the operated eye(s) or eyelid(s).    Wound care and personal care  ? If a patch or bandage has been placed, please leave this in place until seen by your physician. Ensure that the bandage does not get wet when you take a shower.   ? You may shower or wash your hair the day after surgery. Do not go swimming for at least 2 weeks to prevent contamination of your wounds.  ? You may go for walks and light activity is ok, but no heavy (over 15 pounds) lifting, bending or excessive straining for one week.   ? Do not apply make-up to the eyes or eyelids for 2 weeks after surgery.  ? Expect some swelling, bruising, black eye (even into the lower eyelids and cheeks). Also expect serum caking, crusting and discharge from the eye and/or incisions. A small amount of surface bleeding, and depending on the type of surgery, bleeding from the inside of the eyelid, is normal for the first 48 hours.  ? Avoid straining, bending at the waist, or lifting more than 15 pounds for 1 week. Sleeping with your head elevated, such as in a recliner, for the first several days can help swelling resolve more quickly.   ? Do continue to ambulate (walk) as you normally would - being sedentary after surgery can cause blood clots.   ? Your eye(s) and eyelid(s) may be painful and tender. This is normal after surgery.      Contact information and follow-up  ? Return to the Eye Clinic for a follow-up appointment with your physician as scheduled. If no appointment has been scheduled:    ? For severe pain, bleeding, or loss of vision, call the [facility] at [phone].    After hours or on weekends and holidays, call [phone] and ask to speak with the ophthalmologist on call.    An on call person can be reached after hours for concerns. The on call doctor should not call in medication refill  requests after hours or on weekends, so please plan accordingly. An effort has been made to provide adequate pain medications following every surgery, and refills will not be provided in most instances.     Medications  ? Restart all regular home medications and eye drops. If you take Plavix or Aspirin on a regular basis, wait for 72 hours after your surgery before restarting these in order to decrease the risk of bleeding complications.  ? Avoid aspirin and aspirin-like medications (Motrin, Aleve, Ibuprofen, Polina-Nardin etc) for 72 hours to reduce the risk of bleeding. You may take Tylenol (acetaminophen) for pain.  ? In addition to your home medications, take the following post-operative medications as prescribed by your physician.    Please apply over the counter Bacitracin to the incision by your ear 3 times per day for 10 days.     After your patch has been removed:  ? Apply antibiotic ointment to all sutures three times a day, and into the operated eye(s) at night.   Once you run out, you can apply Vaseline or Aquaphor (over the counter) to the incisions. Don't put the Vaseline or Aquaphor into your eyes.   ? If you have ocular irritation, you can use over the counter artificial tears such as Refresh, Systane, or Blink. Do not use Visine, Clear Eyes, or any other drop that gets the red out.   ? In many cases, postoperative discomfort can be managed with Tylenol alone. If narcotic pain medication was prescribed, take pain pills at prescribed frequency as needed for pain.            [facility]  Home Care Following Anesthesia  For 24 hours after surgery:  Get plenty of rest.  A responsible adult must stay with you for at least 24 hours after you leave the surgery center.  Do not drive or use heavy equipment.  If you have weakness or tingling, don't drive or use heavy equipment until this feeling goes away.   Do not drink alcohol.   Avoid strenuous or risky activities.  Ask  for help when climbing stairs.  You may feel lightheaded.  IF so, sit for a few minutes before standing.  Have someone help you get up.   If you have nausea (feel sick to your stomach): Drink only clear liquids such as apple juice, ginger ale, broth or 7-Up.  Rest may also help.  Be sure to drink enough fluids.  Move to a regular diet as you feel able.   You may have a slight fever.  Call the doctor if your fever is over 100 F (37.7 C) (taken under the tongue) or lasts longer than 24 hours.  You may have a dry mouth, a sore throat, muscle aches or trouble sleeping. These should go away after 24 hours.  Do not make important or legal decisions.   It is recommended to avoid smoking.               Tips for taking pain medications  To get the best pain relief possible, remember these points:  Take pain medications as directed, before pain becomes severe.  Pain medication can upset your stomach: taking it with food may help.  Constipation is a common side effect of pain medication. Drink plenty of  fluids.  Eat foods high in fiber. Take a stool softener if recommended by your doctor or pharmacist.  Do not drink alcohol, drive or operate machinery while taking pain medications.  Ask about other ways to control pain, such as with heat, ice or relaxation.    Tylenol/Acetaminophen Consumption    If you feel your pain relief is insufficient, you may take Tylenol/Acetaminophen in addition to your narcotic pain medication.   Be careful not to exceed 4,000 mg of Tylenol/Acetaminophen in a 24 hour period from all sources.  If you are taking extra strength Tylenol/acetaminophen (500 mg), the maximum dose is 8 tablets in 24 hours.  If you are taking regular strength acetaminophen (325 mg), the maximum dose is 12 tablets in 24 hours.    Call a doctor for any of the following:  Signs of infection (fever, growing tenderness at the surgery site, a large amount of drainage or bleeding, severe pain, foul-smelling drainage, redness,  swelling).  It has been over 8 to 10 hours since surgery and you are still not able to urinate (pass water).  Headache for over 24 hours.  Numbness, tingling or weakness the day after surgery (if you had spinal anesthesia).  Signs of Covid-19 infection (temperature over 100 degrees, shortness of breath, cough, loss of taste/smell, generalized body aches, persistent headache, chills, sore throat, nausea/vomiting/diarrhea)  Your doctor is:       Dr. Ingris Shelby, Ophthalmology: [phone]               Or dial [phone] and ask for the resident on call for:  Ophthalmology  For emergency care, call the:  [facility]:  [phone] (TTY for hearing impaired: [phone])

## 2024-01-11 NOTE — OR NURSING
Patient presents for left eye lid surgery with Dr. Shelby.  She takes Warfarin for atrial fibrillation and has held since 1/5/2024  INR 1/10/2024 was 2.0.  Paged surgeon with information and will await further direction and information.    ADDENDUM: No new orders.  Okay to proceed to surgery

## 2024-01-11 NOTE — ANESTHESIA CARE TRANSFER NOTE
Patient: Cherelle Valentine    Procedure: Procedure(s):  Left upper eyelid retraction repair with full thickness skin graft from right ear.       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    Anesthesia Type:   MAC     Note:    Oropharynx: spontaneously breathing  Level of Consciousness: awake  Oxygen Supplementation: room air    Independent Airway: airway patency satisfactory and stable  Dentition: dentition unchanged  Vital Signs Stable: post-procedure vital signs reviewed and stable  Report to RN Given: handoff report given  Patient transferred to: Phase II    Handoff Report: Identifed the Patient, Identified the Reponsible Provider, Reviewed the pertinent medical history, Discussed the surgical course, Reviewed Intra-OP anesthesia mangement and issues during anesthesia, Set expectations for post-procedure period and Allowed opportunity for questions and acknowledgement of understanding  Vitals:  Vitals Value Taken Time   BP     Temp     Pulse     Resp     SpO2         Electronically Signed By: GISSELLE Bill CRNA  January 11, 2024  1:43 PM

## 2024-01-11 NOTE — OP NOTE
Oculoplastic Surgery Operative Note    PREOPERATIVE DIAGNOSIS:    1.  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  This resulted in significant exposure keratopathy and corneal changes. Risks, benefits, and alternatives to the proposed procedure were discussed, and she elected to proceed.    PROCEDURE: Cherelle Valentine was brought to the operating room and placed supine on the operating table. Under monitored anesthesia care the left upper eyelid and right preauricular area were infiltrated with local anesthetic as above.  She was prepped and draped in typical sterile fashion.  Attention was directed to the left side.  A 4-0 silk suture was placed to the eyelid margin and the lid placed on downward traction.  A 15 blade was used to incise through the area of the cicatrix about 8 mm above the lid margin through the scar.  Dissection was carried in the superior plane.  She had prior upper eyelid surgery and there was no significant preaponeurotic fat but the scar tissue was released until the entire lid was nicely mobilized.  The levator aponeurosis was identified and avoided.  Further cicatrix release was carried out nasally and laterally.  The area was measured and the skin deficit was 1.5 cm x 3 cm.  That was marked out in the preauricular area  on the right side.  15 blade was used to incise the skin in front of the ear and the skin graft was excised using Burak scissors.  Hemostasis was assured from the donor site.  Deep closure was achieved with 5-0 Monocryl.  Skin was closed with running 5-0 chromic.  The skin graft was placed into the left upper eyelid and secured in its cardinal positions with 6-0 plain gut suture.  Then the upper and lower aspects were closed with 6-0 plain gut suture.  The 4-0 silk suture was removed.  A temporary tarsorrhaphy was fashioned with 5-0 Monocryl.  A pressure patch was placed.  Erythromycin ophthalmic ointment was placed. Cherelle Valentine tolerated the procedure well and left the operating room in stable condition.     Ingris Shelby MD   This report was dictated using Dragon voice recognition software.

## 2024-01-11 NOTE — BRIEF OP NOTE
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  Complications: None.  Implants: * No implants in log *        Ugo Graham MD  Resident Physician, PGY-2  Department of Ophthalmology

## 2024-01-11 NOTE — ANESTHESIA POSTPROCEDURE EVALUATION
Patient: Cherelle Valentine    Procedure: Procedure(s):  Left upper eyelid retraction repair with full thickness skin graft from right ear.       Anesthesia Type:  MAC    Note:  Disposition: Outpatient   Postop Pain Control: Uneventful            Sign Out: Well controlled pain   PONV: No   Neuro/Psych: Uneventful            Sign Out: Acceptable/Baseline neuro status   Airway/Respiratory: Uneventful            Sign Out: Acceptable/Baseline resp. status   CV/Hemodynamics: Uneventful            Sign Out: Acceptable CV status; No obvious hypovolemia; No obvious fluid overload   Other NRE: NONE   DID A NON-ROUTINE EVENT OCCUR?            Last vitals:  Vitals Value Taken Time   BP     Temp     Pulse     Resp     SpO2         Electronically Signed By: Joseph Delarosa MD  January 11, 2024  1:46 PM

## 2024-01-11 NOTE — ANESTHESIA PREPROCEDURE EVALUATION
Anesthesia Pre-Procedure Evaluation    Patient: Cherelle Valentine   MRN: [id number] : 1940        Procedure : Procedure(s):  Left upper eyelid retraction repair with full thickness skin graft from by left or right ear.          Past Medical History:   Diagnosis Date    Atrial fibrillation (H)     Glaucoma (increased eye pressure)     Hypertension       Past Surgical History:   Procedure Laterality Date    APPENDECTOMY      CATARACT IOL, RT/LT Bilateral     with iStent    HYSTERECTOMY      JOINT REPLACEMENT      MI TRABECULOPLASTY BY LASER SURGERY      REPAIR PTOSIS Bilateral     ZZC TOTAL KNEE ARTHROPLASTY Right 2015    Procedure: KNEE TOTAL ARTHROPLASTY, RIGHT;  Surgeon: Aftab Martel MD;  Location: [facility];  Service: Orthopedics      Allergies   Allergen Reactions    Cephalosporins Anaphylaxis    Iodinated Contrast Media [Iodinated Contrast Media] Anaphylaxis    Penicillins Hives      Social History     Tobacco Use    Smoking status: Never    Smokeless tobacco: Not on file   Substance Use Topics    Alcohol use: Yes     Alcohol/week: 6.0 standard drinks of alcohol      Wt Readings from Last 1 Encounters:   24 83.5 kg (184 lb)        Anesthesia Evaluation            ROS/MED HX  ENT/Pulmonary:       Neurologic:       Cardiovascular:     (+)  hypertension- -   -  - -                        dysrhythmias, a-fib,             METS/Exercise Tolerance:     Hematologic:       Musculoskeletal:       GI/Hepatic:       Renal/Genitourinary:     (+) renal disease, type: CRI,            Endo:     (+)               Obesity,       Psychiatric/Substance Use:       Infectious Disease:       Malignancy:       Other:            Physical Exam    Airway        Mallampati: II       Respiratory Devices and Support         Dental       (+) Minor Abnormalities - some fillings, tiny chips      Cardiovascular          Rhythm and rate: regular     Pulmonary           breath sounds clear to auscultation  "          OUTSIDE LABS:  CBC:   Lab Results   Component Value Date    WBC 8.6 02/12/2022    HGB 14.4 02/12/2022    HCT 43.2 02/12/2022     02/12/2022     BMP:   Lab Results   Component Value Date     02/15/2022     02/14/2022    POTASSIUM 3.9 02/15/2022    POTASSIUM 4.2 02/14/2022    CHLORIDE 117 (H) 02/15/2022    CHLORIDE 115 (H) 02/14/2022    CO2 21 (L) 02/15/2022    CO2 22 02/14/2022    BUN 18 02/15/2022    BUN 26 02/14/2022    CR 0.71 02/15/2022    CR 0.89 02/14/2022    GLC 90 02/15/2022    GLC 95 02/14/2022     COAGS:   Lab Results   Component Value Date    PTT 78 (H) 02/12/2022    INR 3.98 (H) 02/15/2022     POC: No results found for: \"BGM\", \"HCG\", \"HCGS\"  HEPATIC: No results found for: \"ALBUMIN\", \"PROTTOTAL\", \"ALT\", \"AST\", \"GGT\", \"ALKPHOS\", \"BILITOTAL\", \"BILIDIRECT\", \"LINDA\"  OTHER:   Lab Results   Component Value Date    LACT 1.5 02/12/2022    TIANA 8.0 (L) 02/15/2022    PHOS 2.8 02/12/2022    MAG 1.8 02/15/2022    TSH 1.23 02/12/2022       Anesthesia Plan    ASA Status:  3    NPO Status:  NPO Appropriate    Anesthesia Type: MAC.     - Reason for MAC: immobility needed              Consents    Anesthesia Plan(s) and associated risks, benefits, and realistic alternatives discussed. Questions answered and patient/representative(s) expressed understanding.     - Discussed:     - Discussed with:  Patient            Postoperative Care            Comments:               Joseph Delarosa MD    I have reviewed the pertinent notes and labs in the chart from the past 30 days and (re)examined the patient.  Any updates or changes from those notes are reflected in this note.            # Drug Induced Coagulation Defect: home medication list includes an anticoagulant medication   # Obesity: Estimated body mass index is 33.65 kg/m  as calculated from the following:    Height as of this encounter: 1.575 m (5' 2\").    Weight as of this encounter: 83.5 kg (184 lb).      "

## 2024-01-16 ENCOUNTER — OFFICE VISIT (OUTPATIENT)
Dept: OPHTHALMOLOGY | Facility: CLINIC | Age: 84
End: 2024-01-16
Payer: MEDICARE

## 2024-01-16 DIAGNOSIS — Z98.890 POSTSURGICAL STATE, EYE: Primary | ICD-10-CM

## 2024-01-16 PROCEDURE — 99024 POSTOP FOLLOW-UP VISIT: CPT | Mod: GC | Performed by: OPHTHALMOLOGY

## 2024-01-16 RX ORDER — ERYTHROMYCIN 5 MG/G
OINTMENT OPHTHALMIC
Qty: 3.5 G | Refills: 1 | Status: SHIPPED | OUTPATIENT
Start: 2024-01-16 | End: 2024-03-12

## 2024-01-16 ASSESSMENT — TONOMETRY
IOP_METHOD: ICARE
OD_IOP_MMHG: 12
OS_IOP_MMHG: 16

## 2024-01-16 ASSESSMENT — VISUAL ACUITY
OD_SC: 20/30
OS_SC: XX
METHOD: SNELLEN - LINEAR

## 2024-01-16 ASSESSMENT — CONF VISUAL FIELD
OS_SUPERIOR_NASAL_RESTRICTION: 0
OS_INFERIOR_NASAL_RESTRICTION: 0
OD_INFERIOR_NASAL_RESTRICTION: 0
OD_SUPERIOR_TEMPORAL_RESTRICTION: 0
OD_SUPERIOR_NASAL_RESTRICTION: 0
OD_INFERIOR_TEMPORAL_RESTRICTION: 0
OS_INFERIOR_TEMPORAL_RESTRICTION: 0
OS_SUPERIOR_TEMPORAL_RESTRICTION: 0

## 2024-01-16 NOTE — PATIENT INSTRUCTIONS
"- Apply warm compresses for 1 minute two to three times a day until your bruising has resolved. You can continue this for one more month.   - Apply Aquaphor or Vaseline to the incision at bedtime until it is smooth by your ear.   - Apply erythromycin ointment to your left upper eyelid three times a day. Once you run out of the first tube, you can switch to Vaseline or Aquaphor.   - If you have symptoms of eye irritation, it is good to use over the counter artificial tears. Good brands include Refresh, Blink, and Systane. Do NOT get drops that are for \"red eyes.\"   - It is normal for the incision to appear raised, red, itch and have small lumps. You can gently massage any small bumps along the incision line. These can take up to six months to resolve.    "

## 2024-01-16 NOTE — PROGRESS NOTES
"Chief Complaint(s) and History of Present Illness(es)     Follow Up For Surgery Of Eye            Laterality: left eye    Associated symptoms: Negative for redness, jaw claudication, photophobia   and foreign body sensation    Treatments tried: no treatments          Comments    Patient really wants patch removed. Patient has no complaints at this   time.   CALIXTO Ramirez January 16, 2024 11:09 AM     Doing well. Initially shocked when patch taken off at appearance.    Incisions healing well, skin graft well perfused.    Patient Instructions   - Apply warm compresses for 1 minute two to three times a day until your bruising has resolved. You can continue this for one more month.   - Apply Aquaphor or Vaseline to the incision at bedtime until it is smooth.    - If you have symptoms of eye irritation, it is good to use over the counter artificial tears. Good brands include Refresh, Blink, and Systane. Do NOT get drops that are for \"red eyes.\"   - It is normal for the incision to appear raised, red, itch and have small lumps. You can gently massage any small bumps along the incision line. These can take up to six months to resolve.    Return in about 2 months (around 3/16/2024) for Follow up.    Ugo Graham MD  Resident Physician, PGY-2  Department of Ophthalmology        Attending Physician Attestation: Complete documentation of historical and exam elements from today's encounter can be found in the full encounter summary report (not reduplicated in this progress note). I personally obtained the chief complaint(s) and history of present illness. I confirmed and edited as necessary the review of systems, past medical/surgical history, family history, social history, and examination findings as documented by others; and I examined the patient myself. I personally reviewed the relevant tests, images, and reports as documented above. I formulated and edited as necessary the assessment and plan and discussed the " findings and management plan with the patient and family. I personally reviewed the ophthalmic test(s) associated with this encounter, agree with the interpretation(s) as documented by the resident/fellow, and have edited the corresponding report(s) as necessary. Ingris Shelby MD

## 2024-01-16 NOTE — NURSING NOTE
Chief Complaints and History of Present Illnesses   Patient presents with    Follow Up For Surgery Of Eye     Chief Complaint(s) and History of Present Illness(es)       Follow Up For Surgery Of Eye              Laterality: left eye    Associated symptoms: Negative for redness, jaw claudication, photophobia and foreign body sensation    Treatments tried: no treatments              Comments    Patient really wants patch removed. Patient has no complaints at this time.   CALIXTO Ramirez January 16, 2024 11:09 AM

## 2024-03-12 ENCOUNTER — OFFICE VISIT (OUTPATIENT)
Dept: OPHTHALMOLOGY | Facility: CLINIC | Age: 84
End: 2024-03-12
Payer: MEDICARE

## 2024-03-12 DIAGNOSIS — Z98.890 POSTSURGICAL STATE, EYE: Primary | ICD-10-CM

## 2024-03-12 PROCEDURE — 99024 POSTOP FOLLOW-UP VISIT: CPT | Mod: GC | Performed by: OPHTHALMOLOGY

## 2024-03-12 ASSESSMENT — VISUAL ACUITY
OD_CC+: -1
OS_CC: 20/80
OS_CC+: -1
OD_CC: 20/25
CORRECTION_TYPE: GLASSES
METHOD: SNELLEN - LINEAR

## 2024-03-12 ASSESSMENT — TONOMETRY
IOP_METHOD: ICARE
OD_IOP_MMHG: 10
OS_IOP_MMHG: 10

## 2024-03-12 NOTE — NURSING NOTE
Chief Complaints and History of Present Illnesses   Patient presents with    Post Op (Ophthalmology) Left Eye     POM#2 s/p CJ cicatricial retraction repair with full thickness skin graft from right preauricular area     Chief Complaint(s) and History of Present Illness(es)       Post Op (Ophthalmology) Left Eye              Laterality: left eye    Associated symptoms: Negative for eye pain, itching, discharge and burning    Treatments tried: no treatments    Comments: POM#2 s/p CJ cicatricial retraction repair with full thickness skin graft from right preauricular area              Comments    Pt notes that she has a bump on the CJ, VA isn't great in the OS as well. Pt is using vaseline on the incision area TIRAVEN Simpson COT March 12, 2024 9:45 AM

## 2024-03-12 NOTE — PATIENT INSTRUCTIONS
Start using artificial tears like Systane, Refresh, or Blink are good brands. Use them 4 x daily and before bedtime.     Massage the area of the skin graft and massage the eyelid downwards several times throughout the day.

## 2024-03-12 NOTE — PROGRESS NOTES
Chief Complaint(s) and History of Present Illness(es)     Post Op (Ophthalmology) Left Eye            Laterality: left eye    Associated symptoms: Negative for eye pain, itching, discharge and   burning    Treatments tried: no treatments    Comments: POM#2 s/p CJ cicatricial retraction repair with full thickness   skin graft from right preauricular area          Comments    Pt notes that she has a bump on the CJ, VA isn't great in the OS as well.   Pt is using vaseline on the incision area TID.     Sylvia Tatiana COT March 12, 2024 9:45 AM    Cherelle Valentine is about 2 months status post op.  Good skin graft take. Nasally the FTSG appears thick. She has resolution of her lagophthalmos but still significant inferior punctate epithelial erosions (better than preop).     Plan:  Massage the area of the FTSG.  Warm compresses  Artificial tears    Follow-up with me in 4 months. Sooner as needed. Would like to resume care with [facility] once things are improved. We discussed it is possible we may need to revise the area, but it is early.       Ugo Graham MD  Resident Physician, PGY-2  Department of Ophthalmology      Complete documentation of historical and exam elements from today's encounter can be found in the full encounter summary report (not reduplicated in this progress note). I personally obtained the chief complaint(s) and history of present illness.  I confirmed and edited as necessary the review of systems, past medical/surgical history, family history, social history, and examination findings as documented by others; and I examined the patient myself. I personally reviewed the relevant tests, images, and reports as documented above. I formulated and edited as necessary the assessment and plan and discussed the findings and management plan with the patient and family. - Ingris Shelby MD

## 2024-06-12 ENCOUNTER — OFFICE VISIT (OUTPATIENT)
Dept: OPHTHALMOLOGY | Facility: CLINIC | Age: 84
End: 2024-06-12
Payer: MEDICARE

## 2024-06-12 DIAGNOSIS — H16.142 PUNCTATE KERATITIS OF LEFT EYE: ICD-10-CM

## 2024-06-12 DIAGNOSIS — H02.214 CICATRICIAL LAGOPHTHALMOS LEFT UPPER EYELID: Primary | ICD-10-CM

## 2024-06-12 PROCEDURE — 11900 INJECT SKIN LESIONS </W 7: CPT | Performed by: OPHTHALMOLOGY

## 2024-06-12 PROCEDURE — 99213 OFFICE O/P EST LOW 20 MIN: CPT | Mod: 25 | Performed by: OPHTHALMOLOGY

## 2024-06-12 RX ORDER — TRIAMCINOLONE ACETONIDE 40 MG/ML
40 INJECTION, SUSPENSION INTRA-ARTICULAR; INTRAMUSCULAR ONCE
Status: COMPLETED | OUTPATIENT
Start: 2024-06-12 | End: 2024-06-12

## 2024-06-12 RX ADMIN — TRIAMCINOLONE ACETONIDE 40 MG: 40 INJECTION, SUSPENSION INTRA-ARTICULAR; INTRAMUSCULAR at 11:37

## 2024-06-12 ASSESSMENT — VISUAL ACUITY
OS_CC: 20/60
METHOD: SNELLEN - LINEAR
OD_CC+: -3
OD_CC: 20/25
CORRECTION_TYPE: GLASSES

## 2024-06-12 ASSESSMENT — TONOMETRY
OD_IOP_MMHG: 12
IOP_METHOD: ICARE
OS_IOP_MMHG: 11

## 2024-06-12 NOTE — NURSING NOTE
Chief Complaints and History of Present Illnesses   Patient presents with    Post Op (Ophthalmology) Left Eye     Chief Complaint(s) and History of Present Illness(es)       Post Op (Ophthalmology) Left Eye              Laterality: left eye              Comments    Pt returns for 4 month post-op following left upper eyelid retraction repair with full thickness skin graft from right ear 1/11/2024. She adds that the bump on the CJ persists, though it is marginally smaller in appearance than at the March post-op. She has no other complaints. The lid fully closes, and she has no discomfort of the eye or new tearing.

## 2024-06-12 NOTE — PROGRESS NOTES
Chief Complaint(s) and History of Present Illness(es)     Post Op (Ophthalmology) Left Eye            Laterality: left eye          Comments    Pt returns for 4 month post-op following left upper eyelid retraction   repair with full thickness skin graft from right ear 1/11/2024. She adds   that the bump on the CJ persists, though it is marginally smaller in   appearance than at the March post-op. She has no other complaints. The lid   fully closes, and she has no discomfort of the eye or new tearing.       Assessment & Plan     Cherelle Valentine is a 84 year old female with the following diagnoses:   Encounter Diagnoses   Name Primary?    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 Some residual tethering and thickening of the graft.   We discussed options:  Observation  Skin graft revision - lift flap and debulk  Steroid injection or 5FU injection.     Plan:  Kenalog injection left upper eyelid today.  0.3 mL injected into the area of cicatricial lid changes.      Return to clinic in about 6-8 weeks consider 5FU injection. She is considering going back to [facility] (easier commute, and she really like Dr. Lozada).        Attending Physician Attestation: Complete documentation of historical and exam elements from today's encounter can be found in the full encounter summary report (not reduplicated in this progress note). I personally obtained the chief complaint(s) and history of present illness. I confirmed and edited as necessary the review of systems, past medical/surgical history, family history, social history, and examination findings as documented by others; and I examined the patient myself. I personally reviewed the relevant tests, images, and reports as documented above. I formulated and edited as necessary the assessment and plan and discussed the  findings and management plan with the patient.  -Ingris Shelby MD

## 2024-09-28 ENCOUNTER — HEALTH MAINTENANCE LETTER (OUTPATIENT)
Age: 84
End: 2024-09-28

## (undated) DEVICE — EYE PREP BETADINE 5% SOLUTION 30ML 0065-0411-30

## (undated) DEVICE — SU SILK 4-0 RB-1 30" K871H

## (undated) DEVICE — ESU HIGH TEMP LOOP TIP AA03

## (undated) DEVICE — ESU GROUND PAD ADULT W/CORD E7507

## (undated) DEVICE — PACK MINOR EYE CUSTOM ASC

## (undated) DEVICE — SU MONOCRYL 5-0 P-3 18" UND Y493G

## (undated) DEVICE — GLOVE BIOGEL PI MICRO SZ 7.5 48575

## (undated) DEVICE — DRSG EYE PAD STERILE 1.63X2.63" NON21600

## (undated) DEVICE — SU CHROMIC 5-0 P-3 18" 687G

## (undated) DEVICE — LINEN TOWEL PACK X5 5464

## (undated) DEVICE — DRSG TELFA 3X8" 1238

## (undated) DEVICE — DRSG TEGADERM 2 3/8X2 3/4" 1624W

## (undated) RX ORDER — ACETAMINOPHEN 325 MG/1
TABLET ORAL
Status: DISPENSED
Start: 2024-01-11